# Patient Record
Sex: MALE | Race: WHITE
[De-identification: names, ages, dates, MRNs, and addresses within clinical notes are randomized per-mention and may not be internally consistent; named-entity substitution may affect disease eponyms.]

---

## 2017-01-12 ENCOUNTER — RECORD ABSTRACTING (OUTPATIENT)
Age: 82
End: 2017-01-12

## 2017-01-12 DIAGNOSIS — Z86.2 PERSONAL HISTORY OF DISEASES OF THE BLOOD AND BLOOD-FORMING ORGANS AND CERTAIN DISORDERS INVOLVING THE IMMUNE MECHANISM: ICD-10-CM

## 2017-01-12 DIAGNOSIS — Z78.9 OTHER SPECIFIED HEALTH STATUS: ICD-10-CM

## 2017-01-12 DIAGNOSIS — Z86.79 PERSONAL HISTORY OF OTHER DISEASES OF THE CIRCULATORY SYSTEM: ICD-10-CM

## 2017-01-12 DIAGNOSIS — Z87.448 PERSONAL HISTORY OF OTHER DISEASES OF URINARY SYSTEM: ICD-10-CM

## 2017-01-12 DIAGNOSIS — Z86.73 PERSONAL HISTORY OF TRANSIENT ISCHEMIC ATTACK (TIA), AND CEREBRAL INFARCTION W/OUT RESIDUAL DEFICITS: ICD-10-CM

## 2017-01-12 PROBLEM — Z00.00 ENCOUNTER FOR PREVENTIVE HEALTH EXAMINATION: Status: ACTIVE | Noted: 2017-01-12

## 2017-01-12 RX ORDER — ATORVASTATIN CALCIUM 10 MG/1
10 TABLET, FILM COATED ORAL
Refills: 0 | Status: ACTIVE | COMMUNITY

## 2017-01-12 RX ORDER — FOLIC ACID 1 MG/1
1 TABLET ORAL
Refills: 0 | Status: ACTIVE | COMMUNITY

## 2017-10-16 ENCOUNTER — OUTPATIENT (OUTPATIENT)
Dept: OUTPATIENT SERVICES | Facility: HOSPITAL | Age: 82
LOS: 1 days | Discharge: HOME | End: 2017-10-16

## 2017-10-16 DIAGNOSIS — I21.4 NON-ST ELEVATION (NSTEMI) MYOCARDIAL INFARCTION: ICD-10-CM

## 2017-10-16 DIAGNOSIS — Z01.818 ENCOUNTER FOR OTHER PREPROCEDURAL EXAMINATION: ICD-10-CM

## 2017-10-16 DIAGNOSIS — I95.1 ORTHOSTATIC HYPOTENSION: ICD-10-CM

## 2017-10-16 DIAGNOSIS — N10 ACUTE PYELONEPHRITIS: ICD-10-CM

## 2017-10-18 DIAGNOSIS — Z02.9 ENCOUNTER FOR ADMINISTRATIVE EXAMINATIONS, UNSPECIFIED: ICD-10-CM

## 2017-10-19 ENCOUNTER — OUTPATIENT (OUTPATIENT)
Dept: OUTPATIENT SERVICES | Facility: HOSPITAL | Age: 82
LOS: 1 days | Discharge: HOME | End: 2017-10-19

## 2017-10-19 DIAGNOSIS — I95.1 ORTHOSTATIC HYPOTENSION: ICD-10-CM

## 2017-10-19 DIAGNOSIS — N10 ACUTE PYELONEPHRITIS: ICD-10-CM

## 2017-10-19 DIAGNOSIS — I21.4 NON-ST ELEVATION (NSTEMI) MYOCARDIAL INFARCTION: ICD-10-CM

## 2017-10-25 DIAGNOSIS — D64.9 ANEMIA, UNSPECIFIED: ICD-10-CM

## 2017-10-25 DIAGNOSIS — E11.9 TYPE 2 DIABETES MELLITUS WITHOUT COMPLICATIONS: ICD-10-CM

## 2017-10-25 DIAGNOSIS — I12.9 HYPERTENSIVE CHRONIC KIDNEY DISEASE WITH STAGE 1 THROUGH STAGE 4 CHRONIC KIDNEY DISEASE, OR UNSPECIFIED CHRONIC KIDNEY DISEASE: ICD-10-CM

## 2017-10-25 DIAGNOSIS — Z85.51 PERSONAL HISTORY OF MALIGNANT NEOPLASM OF BLADDER: ICD-10-CM

## 2017-10-25 DIAGNOSIS — Z46.59 ENCOUNTER FOR FITTING AND ADJUSTMENT OF OTHER GASTROINTESTINAL APPLIANCE AND DEVICE: ICD-10-CM

## 2017-10-25 DIAGNOSIS — Z87.891 PERSONAL HISTORY OF NICOTINE DEPENDENCE: ICD-10-CM

## 2017-10-25 DIAGNOSIS — J44.9 CHRONIC OBSTRUCTIVE PULMONARY DISEASE, UNSPECIFIED: ICD-10-CM

## 2017-10-25 DIAGNOSIS — N18.3 CHRONIC KIDNEY DISEASE, STAGE 3 (MODERATE): ICD-10-CM

## 2017-10-25 DIAGNOSIS — Z85.21 PERSONAL HISTORY OF MALIGNANT NEOPLASM OF LARYNX: ICD-10-CM

## 2017-11-22 ENCOUNTER — OUTPATIENT (OUTPATIENT)
Dept: OUTPATIENT SERVICES | Facility: HOSPITAL | Age: 82
LOS: 1 days | Discharge: HOME | End: 2017-11-22

## 2017-11-22 DIAGNOSIS — C32.1 MALIGNANT NEOPLASM OF SUPRAGLOTTIS: ICD-10-CM

## 2017-11-22 DIAGNOSIS — Z01.818 ENCOUNTER FOR OTHER PREPROCEDURAL EXAMINATION: ICD-10-CM

## 2018-07-23 PROBLEM — Z86.73 HISTORY OF STROKE: Status: RESOLVED | Noted: 2017-01-12 | Resolved: 2018-07-23

## 2019-08-14 ENCOUNTER — RECORD ABSTRACTING (OUTPATIENT)
Age: 84
End: 2019-08-14

## 2019-08-14 DIAGNOSIS — M19.90 UNSPECIFIED OSTEOARTHRITIS, UNSPECIFIED SITE: ICD-10-CM

## 2019-08-14 DIAGNOSIS — Z85.51 PERSONAL HISTORY OF MALIGNANT NEOPLASM OF BLADDER: ICD-10-CM

## 2019-08-14 DIAGNOSIS — E11.9 TYPE 2 DIABETES MELLITUS W/OUT COMPLICATIONS: ICD-10-CM

## 2019-08-14 DIAGNOSIS — Z80.0 FAMILY HISTORY OF MALIGNANT NEOPLASM OF DIGESTIVE ORGANS: ICD-10-CM

## 2019-08-14 DIAGNOSIS — Z86.79 PERSONAL HISTORY OF OTHER DISEASES OF THE CIRCULATORY SYSTEM: ICD-10-CM

## 2019-08-14 DIAGNOSIS — K21.9 GASTRO-ESOPHAGEAL REFLUX DISEASE W/OUT ESOPHAGITIS: ICD-10-CM

## 2019-08-14 DIAGNOSIS — J44.9 CHRONIC OBSTRUCTIVE PULMONARY DISEASE, UNSPECIFIED: ICD-10-CM

## 2019-08-14 DIAGNOSIS — Z80.8 FAMILY HISTORY OF MALIGNANT NEOPLASM OF OTHER ORGANS OR SYSTEMS: ICD-10-CM

## 2019-08-14 DIAGNOSIS — Z87.891 PERSONAL HISTORY OF NICOTINE DEPENDENCE: ICD-10-CM

## 2019-08-14 RX ORDER — FUROSEMIDE 80 MG/1
TABLET ORAL
Refills: 0 | Status: ACTIVE | COMMUNITY

## 2019-08-14 RX ORDER — CHROMIUM 200 MCG
TABLET ORAL
Refills: 0 | Status: ACTIVE | COMMUNITY

## 2019-08-14 RX ORDER — METOPROLOL TARTRATE 75 MG/1
TABLET, FILM COATED ORAL
Refills: 0 | Status: ACTIVE | COMMUNITY

## 2019-08-14 RX ORDER — GLIPIZIDE 10 MG/1
TABLET, FILM COATED, EXTENDED RELEASE ORAL
Refills: 0 | Status: ACTIVE | COMMUNITY

## 2019-08-14 RX ORDER — ELECTROLYTES/DEXTROSE
SOLUTION, ORAL ORAL
Refills: 0 | Status: ACTIVE | COMMUNITY

## 2019-09-20 ENCOUNTER — APPOINTMENT (OUTPATIENT)
Dept: RADIATION ONCOLOGY | Facility: CLINIC | Age: 84
End: 2019-09-20
Payer: MEDICARE

## 2019-09-20 VITALS
TEMPERATURE: 97.3 F | DIASTOLIC BLOOD PRESSURE: 55 MMHG | SYSTOLIC BLOOD PRESSURE: 116 MMHG | HEART RATE: 69 BPM | RESPIRATION RATE: 18 BRPM

## 2019-09-20 VITALS — WEIGHT: 204.13 LBS

## 2019-09-20 PROCEDURE — 99212 OFFICE O/P EST SF 10 MIN: CPT

## 2019-09-20 RX ORDER — CLOPIDOGREL 75 MG/1
75 TABLET, FILM COATED ORAL
Refills: 0 | Status: DISCONTINUED | COMMUNITY
End: 2019-09-20

## 2019-09-20 RX ORDER — MIDODRINE HYDROCHLORIDE 2.5 MG/1
2.5 TABLET ORAL
Refills: 0 | Status: DISCONTINUED | COMMUNITY
End: 2019-09-20

## 2019-09-20 RX ORDER — PANTOPRAZOLE 40 MG/1
TABLET, DELAYED RELEASE ORAL
Refills: 0 | Status: DISCONTINUED | COMMUNITY
End: 2019-09-20

## 2019-09-20 NOTE — PHYSICAL EXAM
[Normal] : well developed, well nourished, in no acute distress [de-identified] : He has gained 2 lbs.   ECOG 2 (walks with a cane, rather unsteadily).  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   [de-identified] : no findings in the oral cavity.

## 2019-09-20 NOTE — HISTORY OF PRESENT ILLNESS
[FreeTextEntry1] : 9/20/19 follow up-   pt has continued hoarseness, but it is modest.  He denies any pain,  difficulty with swallowing, eating or drinking.   pt seen and scoped by Dr. Urena on 6/20/19  .pt sprayed to nose with viscous lidocaine 2 percent.

## 2019-09-20 NOTE — PROCEDURE
[Topical Lidocaine] : topical lidocaine [Normal] : the true vocal cords ~T were normal [de-identified] : No erythema or edema.   [de-identified] : The scope was entered through the right nares.

## 2019-09-20 NOTE — DISEASE MANAGEMENT
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

## 2020-01-09 ENCOUNTER — OUTPATIENT (OUTPATIENT)
Dept: OUTPATIENT SERVICES | Facility: HOSPITAL | Age: 85
LOS: 1 days | Discharge: HOME | End: 2020-01-09
Payer: MEDICARE

## 2020-01-09 VITALS
RESPIRATION RATE: 20 BRPM | HEART RATE: 66 BPM | DIASTOLIC BLOOD PRESSURE: 74 MMHG | TEMPERATURE: 98 F | WEIGHT: 199.3 LBS | HEIGHT: 70 IN | SYSTOLIC BLOOD PRESSURE: 119 MMHG | OXYGEN SATURATION: 100 %

## 2020-01-09 DIAGNOSIS — Z01.818 ENCOUNTER FOR OTHER PREPROCEDURAL EXAMINATION: ICD-10-CM

## 2020-01-09 DIAGNOSIS — I25.5 ISCHEMIC CARDIOMYOPATHY: ICD-10-CM

## 2020-01-09 DIAGNOSIS — Z93.6 OTHER ARTIFICIAL OPENINGS OF URINARY TRACT STATUS: Chronic | ICD-10-CM

## 2020-01-09 DIAGNOSIS — Z95.1 PRESENCE OF AORTOCORONARY BYPASS GRAFT: Chronic | ICD-10-CM

## 2020-01-09 DIAGNOSIS — I72.3 ANEURYSM OF ILIAC ARTERY: Chronic | ICD-10-CM

## 2020-01-09 LAB
ALBUMIN SERPL ELPH-MCNC: 3.9 G/DL — SIGNIFICANT CHANGE UP (ref 3.5–5.2)
ALP SERPL-CCNC: 75 U/L — SIGNIFICANT CHANGE UP (ref 30–115)
ALT FLD-CCNC: 17 U/L — SIGNIFICANT CHANGE UP (ref 0–41)
ANION GAP SERPL CALC-SCNC: 14 MMOL/L — SIGNIFICANT CHANGE UP (ref 7–14)
APTT BLD: 34 SEC — SIGNIFICANT CHANGE UP (ref 27–39.2)
AST SERPL-CCNC: 21 U/L — SIGNIFICANT CHANGE UP (ref 0–41)
BASOPHILS # BLD AUTO: 0.04 K/UL — SIGNIFICANT CHANGE UP (ref 0–0.2)
BASOPHILS NFR BLD AUTO: 0.5 % — SIGNIFICANT CHANGE UP (ref 0–1)
BILIRUB SERPL-MCNC: 0.6 MG/DL — SIGNIFICANT CHANGE UP (ref 0.2–1.2)
BUN SERPL-MCNC: 49 MG/DL — HIGH (ref 10–20)
CALCIUM SERPL-MCNC: 9.4 MG/DL — SIGNIFICANT CHANGE UP (ref 8.5–10.1)
CHLORIDE SERPL-SCNC: 102 MMOL/L — SIGNIFICANT CHANGE UP (ref 98–110)
CO2 SERPL-SCNC: 24 MMOL/L — SIGNIFICANT CHANGE UP (ref 17–32)
CREAT SERPL-MCNC: 1.9 MG/DL — HIGH (ref 0.7–1.5)
EOSINOPHIL # BLD AUTO: 0.23 K/UL — SIGNIFICANT CHANGE UP (ref 0–0.7)
EOSINOPHIL NFR BLD AUTO: 3.1 % — SIGNIFICANT CHANGE UP (ref 0–8)
ESTIMATED AVERAGE GLUCOSE: 137 MG/DL — HIGH (ref 68–114)
GLUCOSE SERPL-MCNC: 98 MG/DL — SIGNIFICANT CHANGE UP (ref 70–99)
HBA1C BLD-MCNC: 6.4 % — HIGH (ref 4–5.6)
HCT VFR BLD CALC: 39.4 % — LOW (ref 42–52)
HGB BLD-MCNC: 11.5 G/DL — LOW (ref 14–18)
IMM GRANULOCYTES NFR BLD AUTO: 0.9 % — HIGH (ref 0.1–0.3)
INR BLD: 1.2 RATIO — SIGNIFICANT CHANGE UP (ref 0.65–1.3)
LYMPHOCYTES # BLD AUTO: 1.01 K/UL — LOW (ref 1.2–3.4)
LYMPHOCYTES # BLD AUTO: 13.6 % — LOW (ref 20.5–51.1)
MCHC RBC-ENTMCNC: 20.8 PG — LOW (ref 27–31)
MCHC RBC-ENTMCNC: 29.2 G/DL — LOW (ref 32–37)
MCV RBC AUTO: 71.1 FL — LOW (ref 80–94)
MONOCYTES # BLD AUTO: 0.73 K/UL — HIGH (ref 0.1–0.6)
MONOCYTES NFR BLD AUTO: 9.9 % — HIGH (ref 1.7–9.3)
NEUTROPHILS # BLD AUTO: 5.32 K/UL — SIGNIFICANT CHANGE UP (ref 1.4–6.5)
NEUTROPHILS NFR BLD AUTO: 72 % — SIGNIFICANT CHANGE UP (ref 42.2–75.2)
NRBC # BLD: 0 /100 WBCS — SIGNIFICANT CHANGE UP (ref 0–0)
PLATELET # BLD AUTO: 221 K/UL — SIGNIFICANT CHANGE UP (ref 130–400)
POTASSIUM SERPL-MCNC: 4.6 MMOL/L — SIGNIFICANT CHANGE UP (ref 3.5–5)
POTASSIUM SERPL-SCNC: 4.6 MMOL/L — SIGNIFICANT CHANGE UP (ref 3.5–5)
PROT SERPL-MCNC: 8.2 G/DL — HIGH (ref 6–8)
PROTHROM AB SERPL-ACNC: 13.8 SEC — HIGH (ref 9.95–12.87)
RBC # BLD: 5.54 M/UL — SIGNIFICANT CHANGE UP (ref 4.7–6.1)
RBC # FLD: 17.2 % — HIGH (ref 11.5–14.5)
SODIUM SERPL-SCNC: 140 MMOL/L — SIGNIFICANT CHANGE UP (ref 135–146)
WBC # BLD: 7.4 K/UL — SIGNIFICANT CHANGE UP (ref 4.8–10.8)
WBC # FLD AUTO: 7.4 K/UL — SIGNIFICANT CHANGE UP (ref 4.8–10.8)

## 2020-01-09 PROCEDURE — 93010 ELECTROCARDIOGRAM REPORT: CPT

## 2020-01-09 NOTE — H&P PST ADULT - NSICDXPASTMEDICALHX_GEN_ALL_CORE_FT
PAST MEDICAL HISTORY:  Bladder cancer     CAD (coronary artery disease)     CVA (cerebral vascular accident) 2015

## 2020-01-09 NOTE — H&P PST ADULT - NSICDXPASTSURGICALHX_GEN_ALL_CORE_FT
PAST SURGICAL HISTORY:  Iliac artery aneurysm 2002    Presence of urostomy 2003    S/P CABG x 4 1991

## 2020-01-09 NOTE — H&P PST ADULT - HISTORY OF PRESENT ILLNESS
85 y/o male scheduled for icd implant  reports h/o poor ef  reports no c/o cp,sob,palpitations,cough or dysuria  1-2 fos without sob

## 2020-01-13 ENCOUNTER — OUTPATIENT (OUTPATIENT)
Dept: OUTPATIENT SERVICES | Facility: HOSPITAL | Age: 85
LOS: 1 days | Discharge: HOME | End: 2020-01-13

## 2020-01-13 DIAGNOSIS — Z95.1 PRESENCE OF AORTOCORONARY BYPASS GRAFT: Chronic | ICD-10-CM

## 2020-01-13 DIAGNOSIS — Z93.6 OTHER ARTIFICIAL OPENINGS OF URINARY TRACT STATUS: Chronic | ICD-10-CM

## 2020-01-13 DIAGNOSIS — I72.3 ANEURYSM OF ILIAC ARTERY: Chronic | ICD-10-CM

## 2020-01-13 PROBLEM — I63.9 CEREBRAL INFARCTION, UNSPECIFIED: Chronic | Status: ACTIVE | Noted: 2020-01-09

## 2020-01-13 PROBLEM — I25.10 ATHEROSCLEROTIC HEART DISEASE OF NATIVE CORONARY ARTERY WITHOUT ANGINA PECTORIS: Chronic | Status: ACTIVE | Noted: 2020-01-09

## 2020-01-13 PROBLEM — C67.9 MALIGNANT NEOPLASM OF BLADDER, UNSPECIFIED: Chronic | Status: ACTIVE | Noted: 2020-01-09

## 2020-01-13 LAB — MRSA PCR RESULT.: NEGATIVE — SIGNIFICANT CHANGE UP

## 2020-01-22 ENCOUNTER — INPATIENT (INPATIENT)
Facility: HOSPITAL | Age: 85
LOS: 0 days | Discharge: HOME | End: 2020-01-23
Attending: INTERNAL MEDICINE | Admitting: INTERNAL MEDICINE
Payer: MEDICARE

## 2020-01-22 ENCOUNTER — OUTPATIENT (OUTPATIENT)
Dept: OUTPATIENT SERVICES | Facility: HOSPITAL | Age: 85
LOS: 1 days | Discharge: HOME | End: 2020-01-22

## 2020-01-22 VITALS
HEART RATE: 55 BPM | SYSTOLIC BLOOD PRESSURE: 171 MMHG | RESPIRATION RATE: 18 BRPM | DIASTOLIC BLOOD PRESSURE: 71 MMHG | TEMPERATURE: 96 F

## 2020-01-22 DIAGNOSIS — I25.10 ATHEROSCLEROTIC HEART DISEASE OF NATIVE CORONARY ARTERY WITHOUT ANGINA PECTORIS: ICD-10-CM

## 2020-01-22 DIAGNOSIS — I47.2 VENTRICULAR TACHYCARDIA: ICD-10-CM

## 2020-01-22 DIAGNOSIS — Z85.51 PERSONAL HISTORY OF MALIGNANT NEOPLASM OF BLADDER: ICD-10-CM

## 2020-01-22 DIAGNOSIS — I48.92 UNSPECIFIED ATRIAL FLUTTER: ICD-10-CM

## 2020-01-22 DIAGNOSIS — Z82.49 FAMILY HISTORY OF ISCHEMIC HEART DISEASE AND OTHER DISEASES OF THE CIRCULATORY SYSTEM: ICD-10-CM

## 2020-01-22 DIAGNOSIS — Z86.73 PERSONAL HISTORY OF TRANSIENT ISCHEMIC ATTACK (TIA), AND CEREBRAL INFARCTION WITHOUT RESIDUAL DEFICITS: ICD-10-CM

## 2020-01-22 DIAGNOSIS — Z87.891 PERSONAL HISTORY OF NICOTINE DEPENDENCE: ICD-10-CM

## 2020-01-22 DIAGNOSIS — I44.0 ATRIOVENTRICULAR BLOCK, FIRST DEGREE: ICD-10-CM

## 2020-01-22 DIAGNOSIS — Z93.6 OTHER ARTIFICIAL OPENINGS OF URINARY TRACT STATUS: Chronic | ICD-10-CM

## 2020-01-22 DIAGNOSIS — Z02.9 ENCOUNTER FOR ADMINISTRATIVE EXAMINATIONS, UNSPECIFIED: ICD-10-CM

## 2020-01-22 DIAGNOSIS — Z95.1 PRESENCE OF AORTOCORONARY BYPASS GRAFT: Chronic | ICD-10-CM

## 2020-01-22 DIAGNOSIS — I25.5 ISCHEMIC CARDIOMYOPATHY: ICD-10-CM

## 2020-01-22 DIAGNOSIS — I72.3 ANEURYSM OF ILIAC ARTERY: Chronic | ICD-10-CM

## 2020-01-22 DIAGNOSIS — Z95.1 PRESENCE OF AORTOCORONARY BYPASS GRAFT: ICD-10-CM

## 2020-01-22 DIAGNOSIS — I49.3 VENTRICULAR PREMATURE DEPOLARIZATION: ICD-10-CM

## 2020-01-22 DIAGNOSIS — Z95.828 PRESENCE OF OTHER VASCULAR IMPLANTS AND GRAFTS: ICD-10-CM

## 2020-01-22 DIAGNOSIS — I50.9 HEART FAILURE, UNSPECIFIED: ICD-10-CM

## 2020-01-22 DIAGNOSIS — Z93.6 OTHER ARTIFICIAL OPENINGS OF URINARY TRACT STATUS: ICD-10-CM

## 2020-01-22 LAB
GLUCOSE BLDC GLUCOMTR-MCNC: 108 MG/DL — HIGH (ref 70–99)
GLUCOSE BLDC GLUCOMTR-MCNC: 163 MG/DL — HIGH (ref 70–99)
GLUCOSE BLDC GLUCOMTR-MCNC: 253 MG/DL — HIGH (ref 70–99)

## 2020-01-22 PROCEDURE — 71045 X-RAY EXAM CHEST 1 VIEW: CPT | Mod: 26

## 2020-01-22 PROCEDURE — 33249 INSJ/RPLCMT DEFIB W/LEAD(S): CPT

## 2020-01-22 RX ORDER — DEXTROSE 50 % IN WATER 50 %
15 SYRINGE (ML) INTRAVENOUS ONCE
Refills: 0 | Status: DISCONTINUED | OUTPATIENT
Start: 2020-01-22 | End: 2020-01-23

## 2020-01-22 RX ORDER — CEFAZOLIN SODIUM 1 G
1000 VIAL (EA) INJECTION EVERY 8 HOURS
Refills: 0 | Status: COMPLETED | OUTPATIENT
Start: 2020-01-22 | End: 2020-01-23

## 2020-01-22 RX ORDER — GLUCAGON INJECTION, SOLUTION 0.5 MG/.1ML
1 INJECTION, SOLUTION SUBCUTANEOUS ONCE
Refills: 0 | Status: DISCONTINUED | OUTPATIENT
Start: 2020-01-22 | End: 2020-01-23

## 2020-01-22 RX ORDER — FUROSEMIDE 40 MG
20 TABLET ORAL DAILY
Refills: 0 | Status: DISCONTINUED | OUTPATIENT
Start: 2020-01-22 | End: 2020-01-23

## 2020-01-22 RX ORDER — CEFAZOLIN SODIUM 1 G
2000 VIAL (EA) INJECTION ONCE
Refills: 0 | Status: COMPLETED | OUTPATIENT
Start: 2020-01-22 | End: 2020-01-22

## 2020-01-22 RX ORDER — METOPROLOL TARTRATE 50 MG
12.5 TABLET ORAL EVERY 12 HOURS
Refills: 0 | Status: DISCONTINUED | OUTPATIENT
Start: 2020-01-22 | End: 2020-01-23

## 2020-01-22 RX ORDER — SODIUM CHLORIDE 9 MG/ML
1000 INJECTION, SOLUTION INTRAVENOUS
Refills: 0 | Status: DISCONTINUED | OUTPATIENT
Start: 2020-01-22 | End: 2020-01-23

## 2020-01-22 RX ORDER — DEXTROSE 50 % IN WATER 50 %
12.5 SYRINGE (ML) INTRAVENOUS ONCE
Refills: 0 | Status: DISCONTINUED | OUTPATIENT
Start: 2020-01-22 | End: 2020-01-23

## 2020-01-22 RX ORDER — DEXTROSE 50 % IN WATER 50 %
25 SYRINGE (ML) INTRAVENOUS ONCE
Refills: 0 | Status: DISCONTINUED | OUTPATIENT
Start: 2020-01-22 | End: 2020-01-23

## 2020-01-22 RX ORDER — ATORVASTATIN CALCIUM 80 MG/1
10 TABLET, FILM COATED ORAL AT BEDTIME
Refills: 0 | Status: DISCONTINUED | OUTPATIENT
Start: 2020-01-22 | End: 2020-01-23

## 2020-01-22 RX ADMIN — Medication 100 MILLIGRAM(S): at 22:14

## 2020-01-22 RX ADMIN — Medication 100 MILLIGRAM(S): at 17:41

## 2020-01-22 RX ADMIN — Medication 12.5 MILLIGRAM(S): at 17:39

## 2020-01-22 RX ADMIN — ATORVASTATIN CALCIUM 10 MILLIGRAM(S): 80 TABLET, FILM COATED ORAL at 22:11

## 2020-01-22 RX ADMIN — Medication 100 MILLIGRAM(S): at 12:00

## 2020-01-22 RX ADMIN — Medication 100 MILLIGRAM(S): at 12:43

## 2020-01-22 NOTE — PROGRESS NOTE ADULT - SUBJECTIVE AND OBJECTIVE BOX
Electrophysiology Brief Post-Op Note    I have personally seen and examined the patient.  I agree with the history and physical which I have reviewed and noted any changes below.  01-22-20 @ 07:04    PRE-OP DIAGNOSIS: CHF    POST-OP DIAGNOSIS: CHF    PROCEDURE: ICD implant    Physician: Jalen  Assistant: None    ESTIMATED BLOOD LOSS:   10    mL    ANESTHESIA TYPE:  [  ]General Anesthesia  [ X ] Sedation  [  X] Local/Regional    CONDITION  [  ] Critical  [  ] Serious  [  ]Fair  [X  ]Good      SPECIMENS REMOVED (IF APPLICABLE):  none    IMPLANTS (IF APPLICABLE)  ICD    FINDINGS  PLAN OF CARE  - Ancef 1g IVq8 h  - Chest X-ray PA/Lat now and tomorrow am  - Device interrogation tomorrow in am  - DC all heparin produces and lovenox  - No anticoagulation unless recomended by EP attending

## 2020-01-22 NOTE — CHART NOTE - NSCHARTNOTEFT_GEN_A_CORE
Cardiac Electrophysiology Procedure Note    Procedure:  Single Chamber Implantable Cardioverter Defibrillator (ICD) Implant  Defibrillation threshold testing  Fluoroscopy    Indication: Patient with history of ICM,CABG, CAD referred for primary prevention ICD       A thorough discussion was had with the patient and his family concerning all aspects of ICD therapy. We reviewed the data supporting ICD therapy and how it applies individually.  We discussed the procedures, risks and outcomes of ICD implantation an living with an ICD. We discussed management of ICD therapy throughout life, including deactivation of the ICD. After all questions were answered, it was a shared decision to proceed with ICD therapy.      OPERATORS:  Attending:	     Nuno Rao MD	    EQUIPMENT IMPLANTED AND BASELINE PARAMETERS  Pulse Generator:   :	Planview  Model Number	[id number]  Serial Number	[id number]  			  Ventricular Lead:  Model Number:	6935  Serial Number:	[id number]  Location:	RV apical septum  Threshold:	0.6 V at 0.5 msec  Sensin.0  mV  Impedance:	    579 ohms	      DESCRIPTION OF PROCEDURE          The patient was brought to the Procedure Room in a nonsedated and fasting state, having received preoperative antibiotics. Informed, written consent was obtained prior to the procedure.  The left shoulder region was cleaned and prepped with serial applications of chlorhexidine solution. Patient was then covered from head to toe with sterile drapes in the usual manner. Intermittent boluses of Versed and Fentanyl were used to maintain comfort and analgesia throughout the procedure. Blood pressure, oxygenation and level of comfort were stable throughout. The fluoroscopic anatomy of the left shoulder region was inspected.           	The left deltopectoral groove region was defined.  Lidocaine solution were infiltrated into the skin overlying the left deltopectoral groove. A 4.0 cm. incision was then made overlying and parallel to the deltopectoral groove. This incision was extended down to the pre-pectoral fascia of the pectoralis major muscle using blunt and sharp dissection and electrocautery.   	Next, using the modified Seldinger technique, the left axillary vein was accessed once with a micropuncture needle using fluoroscopic guidance.  Fluoroscopy was used to confirm the capability to position the guide wire in the inferior vena cava, thus confirming venous access.  	  	A 9 Fr introducer sheath was inserted over the guide wire, and the dilator was removed and the guide wire was kept in place.  Next, the RV lead was advanced into the heart.  Next, using a combination of straight and curved stylets, the right ventricular lead was advanced to the RV.  A site was found in the RV apical septum with adequate pacing threshold, sensing, and impedance without diaphragmatic stimulation, the lead's screw was extended. The lead's position and adequate slack were confirmed in [place] and VALLECILLO projections.  The introducer sheath was split and removed. The lead's position and adequate slack were confirmed in [place] and VALLE[place]LLO projections.  The lead's sewing sleeve was positioned at the site of entry into muscle layer, and the lead secured to the pre-pectoral fascia with two 2-0 silk sutures tied around the sewing sleeve encapsulating the lead.    	Next, the pocket was inspected for bleeding, and electrocautery applied where appropriate. The wound was irrigated with copious amounts of vancomycin solution.	The lead was wiped clean and then connected to the pulse generator. The lead and pulse generator was coiled into the pocket.   		The wound margins approximated well, without any tension or overlap. The wound was closed using an interrupted layer of 2-0 absorbable Dacron suture for the deep fascial layer. This was followed by a continuous layer of 3-0 absorbable suture. The skin was then closed using 4-0 absorbable Dacron sutures. Steri-strips were placed over the wound.  A dry, sterile dressing was placed over this. The patient's left upper extremity was placed in a sling. An ice pack was placed on top of the wound, and the patient was returned to a hospital room in stable condition. Needle count was performed and found to be consistent at the end of the procedure    COMPLICATIONS:  The patient tolerated the procedure well. There were no immediate complications.      CONCLUSIONS:  Successful single chamber Cardioverter Defibrillator (ICD) Implantation            _______________________________  Nuno Rao MD  Cardiac Electrophysiology

## 2020-01-22 NOTE — PRE-ANESTHESIA EVALUATION ADULT - NSANTHPMHFT_GEN_ALL_CORE
PAST chart reviewed, pt and wife interviewed and examined.  S/P CVA' R/S weakness, walks with support.

## 2020-01-23 ENCOUNTER — TRANSCRIPTION ENCOUNTER (OUTPATIENT)
Age: 85
End: 2020-01-23

## 2020-01-23 VITALS — HEART RATE: 72 BPM | DIASTOLIC BLOOD PRESSURE: 65 MMHG | SYSTOLIC BLOOD PRESSURE: 145 MMHG | RESPIRATION RATE: 18 BRPM

## 2020-01-23 LAB
GLUCOSE BLDC GLUCOMTR-MCNC: 126 MG/DL — HIGH (ref 70–99)
GLUCOSE BLDC GLUCOMTR-MCNC: 161 MG/DL — HIGH (ref 70–99)

## 2020-01-23 PROCEDURE — 71046 X-RAY EXAM CHEST 2 VIEWS: CPT | Mod: 26

## 2020-01-23 PROCEDURE — 93283 PRGRMG EVAL IMPLANTABLE DFB: CPT | Mod: 26

## 2020-01-23 PROCEDURE — 93010 ELECTROCARDIOGRAM REPORT: CPT

## 2020-01-23 RX ORDER — APIXABAN 2.5 MG/1
1 TABLET, FILM COATED ORAL
Qty: 60 | Refills: 2
Start: 2020-01-23 | End: 2020-04-21

## 2020-01-23 RX ORDER — APIXABAN 2.5 MG/1
1 TABLET, FILM COATED ORAL
Qty: 60 | Refills: 1
Start: 2020-01-23 | End: 2020-03-22

## 2020-01-23 RX ORDER — CEPHALEXIN 500 MG
1 CAPSULE ORAL
Qty: 10 | Refills: 0
Start: 2020-01-23 | End: 2020-01-27

## 2020-01-23 RX ADMIN — Medication 12.5 MILLIGRAM(S): at 05:39

## 2020-01-23 RX ADMIN — Medication 20 MILLIGRAM(S): at 05:40

## 2020-01-23 RX ADMIN — Medication 100 MILLIGRAM(S): at 06:27

## 2020-01-23 NOTE — DISCHARGE NOTE PROVIDER - CARE PROVIDER_API CALL
Nuno Rao; ANGELO)  Cardiac Electrophysiology  [address] [place] [address]  Phone: [phone]  Fax: [phone]  Established Patient  Follow Up Time: 2 weeks

## 2020-01-23 NOTE — PROGRESS NOTE ADULT - ASSESSMENT
Impression:    Plan: This is a 84 y/o male with PMH CVA 2015, bladder Ca, CAD s/p CABG x4, CHF who is POD #1 from a SC ICD, no immediate complications noted. On tele review patietn noted to have Aflutter, new onset.     Impression:  S/p SC ICD (Medtronic) on 1/22/2020  New Onset Aflutter  Hx CHF    Plan:  - CXR PENDING  - Will start  Eliquis tomorrow night, 48 hours post procedure  - Interrogation complete, functioning well  - EKG complete, SR  - Keflex 500 mg PO Q 12 x 5 days    Discharge Instructions:  - Start Eliquis Friday NIGHT  - No heavy lifting >5 lbs. for 4-6 weeks  - Do not raise your arm above shoulder level for 4-6 weeks.   - Do not shower for 5 days, may resume on Monday  - No submerging in water for 1 month.  - Leave steri-strips in place, they will fall off on their own.  - No driving for 2 weeks.  - Antibiotics for 5 days  - F/u with Dr. Rao in 2 weeks at Dr. Neil office This is a 84 y/o male with PMH CVA 2015, bladder Ca, CAD s/p CABG x4, CHF who is POD #1 from a SC ICD, no immediate complications noted. On tele review patietn noted to have Aflutter, new onset.     Impression:  S/p SC ICD (Medtronic) on 1/22/2020  New Onset Aflutter with CHADS VASc at least 6 (CHF, CVA, Age>75, CAD)  Hx CHF    Plan:  - Will start  Eliquis Friday night, 48 hours post procedure  - Interrogation complete, functioning well  - EKG complete, SR  - Keflex 500 mg PO Q 12 x 5 days    Discharge Instructions:  - Start Eliquis Friday NIGHT  - No heavy lifting >5 lbs. for 4-6 weeks  - Do not raise your arm above shoulder level for 4-6 weeks.   - Do not shower for 5 days, may resume on Monday  - No submerging in water for 1 month.  - Leave steri-strips in place, they will fall off on their own.  - No driving for 2 weeks.  - Antibiotics for 5 days  - F/u with Dr. Rao in 2 weeks at Dr. Neil office

## 2020-01-23 NOTE — PROGRESS NOTE ADULT - SUBJECTIVE AND OBJECTIVE BOX
INTERVAL HPI/OVERNIGHT EVENTS:  Patient in SR with frequent PVCs, multiple runs NSVT overnight. Patient denies fever, chills, dizziness, syncope, chest pain, palpitations, SOB, cough, abd pain, n/v/d/c, dysuria, hematuria or unusual rash. Tegaderm removed from surgical incision.    MEDICATIONS  (STANDING):  atorvastatin 10 milliGRAM(s) Oral at bedtime  dextrose 5%. 1000 milliLiter(s) (50 mL/Hr) IV Continuous <Continuous>  dextrose 50% Injectable 12.5 Gram(s) IV Push once  dextrose 50% Injectable 25 Gram(s) IV Push once  dextrose 50% Injectable 25 Gram(s) IV Push once  furosemide    Tablet 20 milliGRAM(s) Oral daily  metoprolol succinate ER 12.5 milliGRAM(s) Oral every 12 hours    MEDICATIONS  (PRN):  dextrose 40% Gel 15 Gram(s) Oral once PRN Blood Glucose LESS THAN 70 milliGRAM(s)/deciliter  glucagon  Injectable 1 milliGRAM(s) IntraMuscular once PRN Glucose LESS THAN 70 milligrams/deciliter      Allergies    No Known Allergies    Intolerances        REVIEW OF SYSTEMS  A ten-point review of systems was otherwise negative except as noted.        Vital Signs Last 24 Hrs  T(C): 35.1 (23 Jan 2020 05:08), Max: 36.3 (22 Jan 2020 20:33)  T(F): 95.1 (23 Jan 2020 05:08), Max: 97.3 (22 Jan 2020 20:33)  HR: 78 (23 Jan 2020 05:08) (55 - 95)  BP: 166/88 (23 Jan 2020 05:08) (138/86 - 171/71)  BP(mean): --  RR: 18 (23 Jan 2020 05:08) (18 - 18)  SpO2: --    01-22-20 @ 07:01  -  01-23-20 @ 07:00  --------------------------------------------------------  IN: 410 mL / OUT: 1250 mL / NET: -840 mL        Physical Exam  GENERAL: In no apparent distress, well nourished, and hydrated.  HEART: irregular rate and rhythm; No murmurs, rubs, or gallops.  PULMONARY: Clear to auscultation and perfusion.  No rales, wheezing, or rhonchi bilaterally.  CHEST: Surgial dressing c/d/i, no hematoma or drainage noted.  ABDOMEN: Soft, Nontender, Nondistended; Bowel sounds present  EXTREMITIES:  2+ Peripheral Pulses, No clubbing, cyanosis. trace edema to left ankle (chronic)  NEUROLOGICAL: AO x3, BLACKBURN, speech clear    LABS:        RADIOLOGY & ADDITIONAL TESTS: INTERVAL HPI/OVERNIGHT EVENTS:  Patient in SR with frequent PVCs, multiple runs NSVT overnight. Noted Aflutter, new. Patient denies fever, chills, dizziness, syncope, chest pain, palpitations, SOB, cough, abd pain, n/v/d/c, dysuria, hematuria or unusual rash. Tegaderm removed from surgical incision.    MEDICATIONS  (STANDING):  atorvastatin 10 milliGRAM(s) Oral at bedtime  furosemide    Tablet 20 milliGRAM(s) Oral daily  metoprolol succinate ER 12.5 milliGRAM(s) Oral every 12 hours    MEDICATIONS  (PRN):  dextrose 40% Gel 15 Gram(s) Oral once PRN Blood Glucose LESS THAN 70 milliGRAM(s)/deciliter  glucagon  Injectable 1 milliGRAM(s) IntraMuscular once PRN Glucose LESS THAN 70 milligrams/deciliter      Allergies    No Known Allergies    Intolerances        REVIEW OF SYSTEMS  A ten-point review of systems was otherwise negative except as noted.        Vital Signs Last 24 Hrs  T(C): 35.1 (23 Jan 2020 05:08), Max: 36.3 (22 Jan 2020 20:33)  T(F): 95.1 (23 Jan 2020 05:08), Max: 97.3 (22 Jan 2020 20:33)  HR: 78 (23 Jan 2020 05:08) (55 - 95)  BP: 166/88 (23 Jan 2020 05:08) (138/86 - 171/71)  BP(mean): --  RR: 18 (23 Jan 2020 05:08) (18 - 18)  SpO2: --    01-22-20 @ 07:01  -  01-23-20 @ 07:00  --------------------------------------------------------  IN: 410 mL / OUT: 1250 mL / NET: -840 mL        Physical Exam  GENERAL: In no apparent distress, well nourished, and hydrated.  HEART: irregular rate and rhythm; No murmurs, rubs, or gallops.  PULMONARY: Clear to auscultation and perfusion.  No rales, wheezing, or rhonchi bilaterally.  CHEST: Surgial dressing c/d/i, no hematoma or drainage noted.  ABDOMEN: Soft, Nontender, Nondistended; Bowel sounds present  EXTREMITIES:  2+ Peripheral Pulses, No clubbing, cyanosis. trace edema to left ankle (chronic)  NEUROLOGICAL: AO x3, BLACKBURN, speech clear    LABS:        RADIOLOGY & ADDITIONAL TESTS: INTERVAL HPI/OVERNIGHT EVENTS:  Patient in SR with frequent PVCs, multiple runs NSVT overnight (no longer than 3-4 beats). Noted to have new Aflutter on tele. Patient denies fever, chills, dizziness, syncope, chest pain, palpitations, SOB, cough, abd pain, n/v/d/c, dysuria, hematuria or unusual rash. Tegaderm removed from surgical incision. Steri-strips in place.     MEDICATIONS  (STANDING):  atorvastatin 10 milliGRAM(s) Oral at bedtime  furosemide    Tablet 20 milliGRAM(s) Oral daily  metoprolol succinate ER 12.5 milliGRAM(s) Oral every 12 hours    MEDICATIONS  (PRN):  dextrose 40% Gel 15 Gram(s) Oral once PRN Blood Glucose LESS THAN 70 milliGRAM(s)/deciliter  glucagon  Injectable 1 milliGRAM(s) IntraMuscular once PRN Glucose LESS THAN 70 milligrams/deciliter      Allergies  No Known Allergies  Intolerances    REVIEW OF SYSTEMS  A ten-point review of systems was otherwise negative except as noted.    Vital Signs Last 24 Hrs  T(C): 35.1 (23 Jan 2020 05:08), Max: 36.3 (22 Jan 2020 20:33)  T(F): 95.1 (23 Jan 2020 05:08), Max: 97.3 (22 Jan 2020 20:33)  HR: 78 (23 Jan 2020 05:08) (55 - 95)  BP: 166/88 (23 Jan 2020 05:08) (138/86 - 171/71)  BP(mean): --  RR: 18 (23 Jan 2020 05:08) (18 - 18)  SpO2: --    01-22-20 @ 07:01  -  01-23-20 @ 07:00  --------------------------------------------------------  IN: 410 mL / OUT: 1250 mL / NET: -840 mL      Physical Exam  GENERAL: In no apparent distress, well nourished, and hydrated.  HEART: irregular rate and rhythm; No murmurs, rubs, or gallops.  PULMONARY: Clear to auscultation and perfusion.  No rales, wheezing, or rhonchi bilaterally.  CHEST: Steri-strips c/d/i, no hematoma or drainage noted.  ABDOMEN: Soft, Nontender, Nondistended; Bowel sounds present  EXTREMITIES:  2+ Peripheral Pulses, No clubbing, cyanosis. trace edema to left ankle (chronic)  NEUROLOGICAL: AO x3, BLACKBURN, speech clear    RADIOLOGY & ADDITIONAL TESTS:  < from: Xray Chest 2 Views PA/Lat (01.23.20 @ 10:08) >  Findings:  Support devices: Telemetry leads are seen. Single lead left-sided ICD.  Cardiac/mediastinum/hilum: Patient is status post median sternotomy. The heart is enlarged.  Lung parenchyma/Pleura: Right pleural thickening.  Skeleton/soft tissues: Unchanged.    Impression:    Right pleural thickening with cardiomegaly. Support devices as described.  < end of copied text >

## 2020-01-23 NOTE — DISCHARGE NOTE NURSING/CASE MANAGEMENT/SOCIAL WORK - NSDCPEPT PROEDHF_GEN_ALL_CORE
Report signs and symptoms to primary care provider/Monitor weight daily/Call primary care provider for follow up after discharge/Low salt diet/Activities as tolerated

## 2020-01-23 NOTE — DISCHARGE NOTE PROVIDER - NSDCMRMEDTOKEN_GEN_ALL_CORE_FT
apixaban 5 mg oral tablet: 1 tab(s) orally 2 times a day MDD:2  atorvastatin 10 mg oral tablet: 1 tab(s) orally once a day  cephalexin 500 mg oral capsule: 1 cap(s) orally 2 times a day MDD:2  furosemide 20 mg oral tablet: 1 tab(s) orally once a day (in the morning)  glimepiride 2 mg oral tablet: 1 tab(s) orally once a day  metoprolol succinate 25 mg oral tablet, extended release: 0.5 tab(s) orally 3 times a day apixaban 2.5 mg oral tablet: 1 tab(s) orally every 12 hours   atorvastatin 10 mg oral tablet: 1 tab(s) orally once a day  cephalexin 500 mg oral capsule: 1 cap(s) orally 2 times a day MDD:2  furosemide 20 mg oral tablet: 1 tab(s) orally once a day (in the morning)  glimepiride 2 mg oral tablet: 1 tab(s) orally once a day  metoprolol succinate 25 mg oral tablet, extended release: 0.5 tab(s) orally 3 times a day

## 2020-01-23 NOTE — DISCHARGE NOTE NURSING/CASE MANAGEMENT/SOCIAL WORK - PATIENT PORTAL LINK FT
You can access the FollowMyHealth Patient Portal offered by [facility] by registering at the following website: [email or web address]. By joining [employer]’s FollowMyHealth portal, you will also be able to view your health information using other applications (apps) compatible with our system.

## 2020-01-23 NOTE — PROGRESS NOTE ADULT - ATTENDING COMMENTS
Covering for Dr. Rao    CXR, interrogation and tele reviewed.  Keflex 500 mg PO BID x 5 days.  Start Eliquis 2.5mg PO BID for new onset AFlutter with CHADS VASc at least 6. Pt >80 and Cr 1.9.   No shower x 3 days.  No lifting left arm above shoulder level x 1 month. No heavy lifting with left arm x 1 mo.  No swimming or hot tubs x 1 month.  Follow up with Dr. Rao in Dr. Mckeon's office in 2 weeks ([address] [place] [address] [address] Floor)

## 2020-01-23 NOTE — DISCHARGE NOTE NURSING/CASE MANAGEMENT/SOCIAL WORK - NSDCPEEMAIL_GEN_ALL_CORE
[facility] for Tobacco Control email [email or web address]

## 2020-01-23 NOTE — DISCHARGE NOTE NURSING/CASE MANAGEMENT/SOCIAL WORK - NSTRANSFERDENTURES_GEN_A_NUR
Message   Received: Today   Message Contents   ROLDAN Oliveira R.N. BMP if not done at dialysis today.    Previous Messages      ----- Message -----   From: Orin Hansen R.N.   Sent: 8/22/2019   4:41 PM PDT   To: James Mendoza M.D.         ----- Message -----   From: ILAN Montes   Sent: 8/22/2019   4:36 PM PDT   To: EDU Moran,     I saw this FK patient today.  He thought Dr. mendoza had ordered labs for him to do Monday but I don't see anything ordered.  Can you check with FK?     Thanks,   clint KAUFFMAN prder placed.    Pt called. No answer. LVM with above information. Pt informed if already drawn at dialysis today, then it is not neccessary to be drawn again. Contact information provided for questions or concerns.   
full

## 2020-01-23 NOTE — DISCHARGE NOTE NURSING/CASE MANAGEMENT/SOCIAL WORK - NSDCPEWEB_GEN_ALL_CORE
[facility] for Tobacco Control website --- [email or web address] website --- [email or web address]

## 2020-01-23 NOTE — DISCHARGE NOTE PROVIDER - NSDCFUADDINST_GEN_ALL_CORE_FT
- Start Eliquis Friday NIGHT 1/24  - No heavy lifting >5 lbs. for 4-6 weeks  - Do not raise your arm above shoulder level for 4-6 weeks.   - Do not shower for 5 days, may resume on Monday  - No submerging in water for 1 month.  - Leave steri-strips in place, they will fall off on their own.  - No driving for 2 weeks.  - Antibiotics for 5 days

## 2020-04-23 ENCOUNTER — APPOINTMENT (OUTPATIENT)
Dept: CARDIOLOGY | Facility: CLINIC | Age: 85
End: 2020-04-23
Payer: MEDICARE

## 2020-04-23 PROCEDURE — 93296 REM INTERROG EVL PM/IDS: CPT

## 2020-04-23 PROCEDURE — 93295 DEV INTERROG REMOTE 1/2/MLT: CPT

## 2020-07-22 ENCOUNTER — APPOINTMENT (OUTPATIENT)
Dept: CARDIOLOGY | Facility: CLINIC | Age: 85
End: 2020-07-22

## 2020-07-23 ENCOUNTER — APPOINTMENT (OUTPATIENT)
Dept: CARDIOLOGY | Facility: CLINIC | Age: 85
End: 2020-07-23

## 2020-10-05 ENCOUNTER — APPOINTMENT (OUTPATIENT)
Dept: RADIATION ONCOLOGY | Facility: HOSPITAL | Age: 85
End: 2020-10-05
Payer: MEDICARE

## 2020-10-05 VITALS
WEIGHT: 194 LBS | DIASTOLIC BLOOD PRESSURE: 79 MMHG | OXYGEN SATURATION: 98 % | SYSTOLIC BLOOD PRESSURE: 118 MMHG | TEMPERATURE: 97.2 F | RESPIRATION RATE: 16 BRPM | HEART RATE: 68 BPM

## 2020-10-05 PROCEDURE — 99212 OFFICE O/P EST SF 10 MIN: CPT

## 2020-10-05 RX ORDER — GLIMEPIRIDE 2 MG/1
2 TABLET ORAL
Qty: 90 | Refills: 0 | Status: DISCONTINUED | COMMUNITY
Start: 2020-05-19

## 2020-10-05 RX ORDER — CIPROFLOXACIN HYDROCHLORIDE 500 MG/1
500 TABLET, FILM COATED ORAL
Qty: 21 | Refills: 0 | Status: ACTIVE | COMMUNITY
Start: 2020-07-21

## 2020-10-05 RX ORDER — APIXABAN 2.5 MG/1
2.5 TABLET, FILM COATED ORAL
Qty: 180 | Refills: 0 | Status: ACTIVE | COMMUNITY
Start: 2020-04-21

## 2020-10-05 RX ORDER — FUROSEMIDE 20 MG/1
20 TABLET ORAL
Qty: 90 | Refills: 0 | Status: DISCONTINUED | COMMUNITY
Start: 2020-02-10

## 2020-10-05 RX ORDER — ASPIRIN 81 MG
81 TABLET, DELAYED RELEASE (ENTERIC COATED) ORAL
Refills: 0 | Status: DISCONTINUED | COMMUNITY
End: 2020-10-05

## 2020-10-05 RX ORDER — BLOOD SUGAR DIAGNOSTIC
STRIP MISCELLANEOUS
Qty: 300 | Refills: 0 | Status: ACTIVE | COMMUNITY
Start: 2020-09-22

## 2020-10-05 NOTE — DISEASE MANAGEMENT
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

## 2020-10-05 NOTE — HISTORY OF PRESENT ILLNESS
[FreeTextEntry1] : 10/5/2020 Follow up: Pt returns for 3 year follow up. Tolerating regular food. Denies difficulty swallowing. Lost 10lbs since last year. Pt and wife state he eats well. He followed up with Dr. Arredondo about 4 months ago because a piece of his hearing was stuck in his ear. Has not seen Dr. Urena d/t COVID. \par \par 9/20/19 follow up-   pt has continued hoarseness, but it is modest.  He denies any pain,  difficulty with swallowing, eating or drinking.   pt seen and scoped by Dr. Urena on 6/20/19  .pt sprayed to nose with viscous lidocaine 2 percent.

## 2020-10-05 NOTE — PHYSICAL EXAM
[Normal] : no respiratory distress, lungs were clear to auscultation bilaterally [de-identified] : His weight loss has not been noticable.  [de-identified] : voice is normal, right semi-ptosis, but no nodes appreciated in the neck.  He declined an exam with the scope.  [de-identified] : overall no masses in the neck

## 2020-10-05 NOTE — VITALS
[60: Requires occasional assistance, but is able to care for most of his/her needs] : 60: Requires occasional assistance, but is able to care for most of his/her needs

## 2020-10-22 ENCOUNTER — APPOINTMENT (OUTPATIENT)
Dept: CARDIOLOGY | Facility: CLINIC | Age: 85
End: 2020-10-22
Payer: MEDICARE

## 2020-10-22 PROCEDURE — 93296 REM INTERROG EVL PM/IDS: CPT

## 2020-10-22 PROCEDURE — 93295 DEV INTERROG REMOTE 1/2/MLT: CPT

## 2021-01-21 ENCOUNTER — APPOINTMENT (OUTPATIENT)
Dept: CARDIOLOGY | Facility: CLINIC | Age: 86
End: 2021-01-21
Payer: MEDICARE

## 2021-01-21 PROCEDURE — 93295 DEV INTERROG REMOTE 1/2/MLT: CPT

## 2021-01-21 PROCEDURE — 93296 REM INTERROG EVL PM/IDS: CPT

## 2021-04-16 ENCOUNTER — APPOINTMENT (OUTPATIENT)
Dept: RADIATION ONCOLOGY | Facility: HOSPITAL | Age: 86
End: 2021-04-16
Payer: MEDICARE

## 2021-04-16 VITALS
SYSTOLIC BLOOD PRESSURE: 109 MMHG | WEIGHT: 199.6 LBS | DIASTOLIC BLOOD PRESSURE: 59 MMHG | RESPIRATION RATE: 12 BRPM | OXYGEN SATURATION: 98 % | HEART RATE: 55 BPM | TEMPERATURE: 96.8 F

## 2021-04-16 DIAGNOSIS — C32.1 MALIGNANT NEOPLASM OF SUPRAGLOTTIS: ICD-10-CM

## 2021-04-16 PROCEDURE — 92511 NASOPHARYNGOSCOPY: CPT

## 2021-04-16 PROCEDURE — 99212 OFFICE O/P EST SF 10 MIN: CPT | Mod: 25

## 2021-04-16 NOTE — HISTORY OF PRESENT ILLNESS
[FreeTextEntry1] : 04/16/2021 F/U Patient returns for a four year follow up. His voice remains hoarse, denies any pain, nausea, vomiting or diarrhea he does have some difficulty swallowing certain foods such as peanuts.  Over all he's eating well, no weight loss at this time, he goes between 195lbs - 199lbs. He finds he no longer make the trek for an appointment with Dr Urena in [place].   \par \par 10/5/2020 Follow up: Pt returns for 3 year follow up. Tolerating regular food. Denies difficulty swallowing. Lost 10lbs since last year. Pt and wife state he eats well. He followed up with Dr. Arredondo about 4 months ago because a piece of his hearing was stuck in his ear. Has not seen Dr. Urena d/t KATEID. \par \par 9/20/19 follow up-   pt has continued hoarseness, but it is modest.  He denies any pain,  difficulty with swallowing, eating or drinking.   pt seen and scoped by Dr. Urena on 6/20/19  .pt sprayed to nose with viscous lidocaine 2 percent.

## 2021-04-16 NOTE — PHYSICAL EXAM
[Normal] : no respiratory distress, lungs were clear to auscultation bilaterally [de-identified] : His weight loss has not been noticable.  [de-identified] : voice is normal, right semi-ptosis, but no nodes appreciated in the neck.  Using the fiberoptic scope the larynx was well seen.  There is no anatomic distortionl  The cords are moving well.  There are no suspicious findings.   [de-identified] : overall no masses in the neck

## 2021-04-16 NOTE — DISEASE MANAGEMENT
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

## 2021-04-22 ENCOUNTER — APPOINTMENT (OUTPATIENT)
Dept: CARDIOLOGY | Facility: CLINIC | Age: 86
End: 2021-04-22
Payer: MEDICARE

## 2021-04-22 ENCOUNTER — NON-APPOINTMENT (OUTPATIENT)
Age: 86
End: 2021-04-22

## 2021-04-22 PROCEDURE — 93295 DEV INTERROG REMOTE 1/2/MLT: CPT

## 2021-04-22 PROCEDURE — 93296 REM INTERROG EVL PM/IDS: CPT

## 2021-07-22 ENCOUNTER — NON-APPOINTMENT (OUTPATIENT)
Age: 86
End: 2021-07-22

## 2021-07-22 ENCOUNTER — APPOINTMENT (OUTPATIENT)
Dept: CARDIOLOGY | Facility: CLINIC | Age: 86
End: 2021-07-22
Payer: MEDICARE

## 2021-07-22 PROCEDURE — 93295 DEV INTERROG REMOTE 1/2/MLT: CPT

## 2021-07-22 PROCEDURE — 93296 REM INTERROG EVL PM/IDS: CPT

## 2021-10-21 ENCOUNTER — APPOINTMENT (OUTPATIENT)
Dept: CARDIOLOGY | Facility: CLINIC | Age: 86
End: 2021-10-21
Payer: MEDICARE

## 2021-10-21 ENCOUNTER — NON-APPOINTMENT (OUTPATIENT)
Age: 86
End: 2021-10-21

## 2021-10-21 PROCEDURE — 93296 REM INTERROG EVL PM/IDS: CPT | Mod: NC

## 2021-10-21 PROCEDURE — 93295 DEV INTERROG REMOTE 1/2/MLT: CPT

## 2022-01-01 ENCOUNTER — NON-APPOINTMENT (OUTPATIENT)
Age: 87
End: 2022-01-01

## 2022-01-01 ENCOUNTER — APPOINTMENT (OUTPATIENT)
Dept: CARDIOLOGY | Facility: CLINIC | Age: 87
End: 2022-01-01

## 2022-01-01 ENCOUNTER — APPOINTMENT (OUTPATIENT)
Dept: CARDIOLOGY | Facility: CLINIC | Age: 87
End: 2022-01-01
Payer: MEDICARE

## 2022-01-01 PROCEDURE — 93295 DEV INTERROG REMOTE 1/2/MLT: CPT

## 2022-01-01 PROCEDURE — 93296 REM INTERROG EVL PM/IDS: CPT

## 2022-01-20 ENCOUNTER — NON-APPOINTMENT (OUTPATIENT)
Age: 87
End: 2022-01-20

## 2022-01-20 ENCOUNTER — APPOINTMENT (OUTPATIENT)
Dept: CARDIOLOGY | Facility: CLINIC | Age: 87
End: 2022-01-20
Payer: MEDICARE

## 2022-01-20 PROCEDURE — 93295 DEV INTERROG REMOTE 1/2/MLT: CPT

## 2022-01-20 PROCEDURE — 93296 REM INTERROG EVL PM/IDS: CPT

## 2023-01-01 ENCOUNTER — NON-APPOINTMENT (OUTPATIENT)
Age: 88
End: 2023-01-01

## 2023-01-01 ENCOUNTER — APPOINTMENT (OUTPATIENT)
Dept: CARDIOLOGY | Facility: CLINIC | Age: 88
End: 2023-01-01
Payer: MEDICARE

## 2023-01-01 ENCOUNTER — INPATIENT (INPATIENT)
Facility: HOSPITAL | Age: 88
LOS: 12 days | DRG: 871 | End: 2023-02-26
Attending: INTERNAL MEDICINE | Admitting: INTERNAL MEDICINE
Payer: MEDICARE

## 2023-01-01 VITALS — DIASTOLIC BLOOD PRESSURE: 55 MMHG | SYSTOLIC BLOOD PRESSURE: 92 MMHG | HEART RATE: 100 BPM

## 2023-01-01 VITALS
HEIGHT: 70 IN | WEIGHT: 139.99 LBS | HEART RATE: 92 BPM | TEMPERATURE: 98 F | OXYGEN SATURATION: 91 % | RESPIRATION RATE: 20 BRPM | SYSTOLIC BLOOD PRESSURE: 122 MMHG | DIASTOLIC BLOOD PRESSURE: 82 MMHG

## 2023-01-01 DIAGNOSIS — R13.10 DYSPHAGIA, UNSPECIFIED: ICD-10-CM

## 2023-01-01 DIAGNOSIS — I72.3 ANEURYSM OF ILIAC ARTERY: Chronic | ICD-10-CM

## 2023-01-01 DIAGNOSIS — R06.00 DYSPNEA, UNSPECIFIED: ICD-10-CM

## 2023-01-01 DIAGNOSIS — Z95.1 PRESENCE OF AORTOCORONARY BYPASS GRAFT: Chronic | ICD-10-CM

## 2023-01-01 DIAGNOSIS — Z95.810 PRESENCE OF AUTOMATIC (IMPLANTABLE) CARDIAC DEFIBRILLATOR: Chronic | ICD-10-CM

## 2023-01-01 DIAGNOSIS — J18.9 PNEUMONIA, UNSPECIFIED ORGANISM: ICD-10-CM

## 2023-01-01 DIAGNOSIS — Z93.6 OTHER ARTIFICIAL OPENINGS OF URINARY TRACT STATUS: Chronic | ICD-10-CM

## 2023-01-01 DIAGNOSIS — R45.1 RESTLESSNESS AND AGITATION: ICD-10-CM

## 2023-01-01 DIAGNOSIS — Z51.5 ENCOUNTER FOR PALLIATIVE CARE: ICD-10-CM

## 2023-01-01 DIAGNOSIS — A41.9 SEPSIS, UNSPECIFIED ORGANISM: ICD-10-CM

## 2023-01-01 LAB
-  AMIKACIN: SIGNIFICANT CHANGE UP
-  AMIKACIN: SIGNIFICANT CHANGE UP
-  AMOXICILLIN/CLAVULANIC ACID: SIGNIFICANT CHANGE UP
-  AMPICILLIN/SULBACTAM: SIGNIFICANT CHANGE UP
-  AMPICILLIN: SIGNIFICANT CHANGE UP
-  AZTREONAM: SIGNIFICANT CHANGE UP
-  AZTREONAM: SIGNIFICANT CHANGE UP
-  CEFAZOLIN: SIGNIFICANT CHANGE UP
-  CEFEPIME: SIGNIFICANT CHANGE UP
-  CEFEPIME: SIGNIFICANT CHANGE UP
-  CEFTRIAXONE: SIGNIFICANT CHANGE UP
-  CEFTRIAXONE: SIGNIFICANT CHANGE UP
-  CEFUROXIME: SIGNIFICANT CHANGE UP
-  CIPROFLOXACIN: SIGNIFICANT CHANGE UP
-  CIPROFLOXACIN: SIGNIFICANT CHANGE UP
-  ERTAPENEM: SIGNIFICANT CHANGE UP
-  GENTAMICIN: SIGNIFICANT CHANGE UP
-  GENTAMICIN: SIGNIFICANT CHANGE UP
-  IMIPENEM: SIGNIFICANT CHANGE UP
-  LEVOFLOXACIN: SIGNIFICANT CHANGE UP
-  LEVOFLOXACIN: SIGNIFICANT CHANGE UP
-  MEROPENEM: SIGNIFICANT CHANGE UP
-  MEROPENEM: SIGNIFICANT CHANGE UP
-  NITROFURANTOIN: SIGNIFICANT CHANGE UP
-  PIPERACILLIN/TAZOBACTAM: SIGNIFICANT CHANGE UP
-  PIPERACILLIN/TAZOBACTAM: SIGNIFICANT CHANGE UP
-  TOBRAMYCIN: SIGNIFICANT CHANGE UP
-  TOBRAMYCIN: SIGNIFICANT CHANGE UP
-  TRIMETHOPRIM/SULFAMETHOXAZOLE: SIGNIFICANT CHANGE UP
-  TRIMETHOPRIM/SULFAMETHOXAZOLE: SIGNIFICANT CHANGE UP
A1C WITH ESTIMATED AVERAGE GLUCOSE RESULT: 5.6 % — SIGNIFICANT CHANGE UP (ref 4–5.6)
ALBUMIN SERPL ELPH-MCNC: 2.8 G/DL — LOW (ref 3.5–5.2)
ALBUMIN SERPL ELPH-MCNC: 3 G/DL — LOW (ref 3.5–5.2)
ALBUMIN SERPL ELPH-MCNC: 3 G/DL — LOW (ref 3.5–5.2)
ALBUMIN SERPL ELPH-MCNC: 3.4 G/DL — LOW (ref 3.5–5.2)
ALP SERPL-CCNC: 106 U/L — SIGNIFICANT CHANGE UP (ref 30–115)
ALP SERPL-CCNC: 118 U/L — HIGH (ref 30–115)
ALP SERPL-CCNC: 121 U/L — HIGH (ref 30–115)
ALP SERPL-CCNC: 90 U/L — SIGNIFICANT CHANGE UP (ref 30–115)
ALT FLD-CCNC: 23 U/L — SIGNIFICANT CHANGE UP (ref 0–41)
ALT FLD-CCNC: 23 U/L — SIGNIFICANT CHANGE UP (ref 0–41)
ALT FLD-CCNC: 39 U/L — SIGNIFICANT CHANGE UP (ref 0–41)
ALT FLD-CCNC: 40 U/L — SIGNIFICANT CHANGE UP (ref 0–41)
ANION GAP SERPL CALC-SCNC: 10 MMOL/L — SIGNIFICANT CHANGE UP (ref 7–14)
ANION GAP SERPL CALC-SCNC: 10 MMOL/L — SIGNIFICANT CHANGE UP (ref 7–14)
ANION GAP SERPL CALC-SCNC: 9 MMOL/L — SIGNIFICANT CHANGE UP (ref 7–14)
ANION GAP SERPL CALC-SCNC: 9 MMOL/L — SIGNIFICANT CHANGE UP (ref 7–14)
APPEARANCE UR: ABNORMAL
APTT BLD: 40.4 SEC — HIGH (ref 27–39.2)
AST SERPL-CCNC: 30 U/L — SIGNIFICANT CHANGE UP (ref 0–41)
AST SERPL-CCNC: 36 U/L — SIGNIFICANT CHANGE UP (ref 0–41)
AST SERPL-CCNC: 44 U/L — HIGH (ref 0–41)
AST SERPL-CCNC: 48 U/L — HIGH (ref 0–41)
BACTERIA # UR AUTO: ABNORMAL /HPF
BASE EXCESS BLDA CALC-SCNC: -3.8 MMOL/L — LOW (ref -2–3)
BASE EXCESS BLDV CALC-SCNC: -1.5 MMOL/L — SIGNIFICANT CHANGE UP (ref -2–3)
BASOPHILS # BLD AUTO: 0.01 K/UL — SIGNIFICANT CHANGE UP (ref 0–0.2)
BASOPHILS # BLD AUTO: 0.02 K/UL — SIGNIFICANT CHANGE UP (ref 0–0.2)
BASOPHILS NFR BLD AUTO: 0.1 % — SIGNIFICANT CHANGE UP (ref 0–1)
BASOPHILS NFR BLD AUTO: 0.3 % — SIGNIFICANT CHANGE UP (ref 0–1)
BILIRUB DIRECT SERPL-MCNC: 0.2 MG/DL — SIGNIFICANT CHANGE UP (ref 0–0.3)
BILIRUB INDIRECT FLD-MCNC: 0.2 MG/DL — SIGNIFICANT CHANGE UP (ref 0.2–1.2)
BILIRUB SERPL-MCNC: 0.4 MG/DL — SIGNIFICANT CHANGE UP (ref 0.2–1.2)
BILIRUB SERPL-MCNC: 0.5 MG/DL — SIGNIFICANT CHANGE UP (ref 0.2–1.2)
BILIRUB SERPL-MCNC: 0.5 MG/DL — SIGNIFICANT CHANGE UP (ref 0.2–1.2)
BILIRUB SERPL-MCNC: 0.8 MG/DL — SIGNIFICANT CHANGE UP (ref 0.2–1.2)
BILIRUB UR-MCNC: NEGATIVE — SIGNIFICANT CHANGE UP
BUN SERPL-MCNC: 39 MG/DL — HIGH (ref 10–20)
BUN SERPL-MCNC: 45 MG/DL — HIGH (ref 10–20)
BUN SERPL-MCNC: 49 MG/DL — HIGH (ref 10–20)
BUN SERPL-MCNC: 49 MG/DL — HIGH (ref 10–20)
CA-I SERPL-SCNC: 1.35 MMOL/L — HIGH (ref 1.15–1.33)
CALCIUM SERPL-MCNC: 9.1 MG/DL — SIGNIFICANT CHANGE UP (ref 8.4–10.5)
CALCIUM SERPL-MCNC: 9.5 MG/DL — SIGNIFICANT CHANGE UP (ref 8.4–10.5)
CALCIUM SERPL-MCNC: 9.5 MG/DL — SIGNIFICANT CHANGE UP (ref 8.4–10.5)
CALCIUM SERPL-MCNC: 9.9 MG/DL — SIGNIFICANT CHANGE UP (ref 8.4–10.5)
CHLORIDE SERPL-SCNC: 105 MMOL/L — SIGNIFICANT CHANGE UP (ref 98–110)
CHLORIDE SERPL-SCNC: 108 MMOL/L — SIGNIFICANT CHANGE UP (ref 98–110)
CHLORIDE SERPL-SCNC: 108 MMOL/L — SIGNIFICANT CHANGE UP (ref 98–110)
CHLORIDE SERPL-SCNC: 110 MMOL/L — SIGNIFICANT CHANGE UP (ref 98–110)
CO2 SERPL-SCNC: 20 MMOL/L — SIGNIFICANT CHANGE UP (ref 17–32)
CO2 SERPL-SCNC: 21 MMOL/L — SIGNIFICANT CHANGE UP (ref 17–32)
CO2 SERPL-SCNC: 22 MMOL/L — SIGNIFICANT CHANGE UP (ref 17–32)
CO2 SERPL-SCNC: 25 MMOL/L — SIGNIFICANT CHANGE UP (ref 17–32)
COLOR SPEC: YELLOW — SIGNIFICANT CHANGE UP
CREAT SERPL-MCNC: 1.3 MG/DL — SIGNIFICANT CHANGE UP (ref 0.7–1.5)
CREAT SERPL-MCNC: 1.7 MG/DL — HIGH (ref 0.7–1.5)
CREAT SERPL-MCNC: 2.1 MG/DL — HIGH (ref 0.7–1.5)
CREAT SERPL-MCNC: 2.1 MG/DL — HIGH (ref 0.7–1.5)
CULTURE RESULTS: SIGNIFICANT CHANGE UP
DIFF PNL FLD: ABNORMAL
EGFR: 30 ML/MIN/1.73M2 — LOW
EGFR: 30 ML/MIN/1.73M2 — LOW
EGFR: 38 ML/MIN/1.73M2 — LOW
EGFR: 53 ML/MIN/1.73M2 — LOW
EOSINOPHIL # BLD AUTO: 0.01 K/UL — SIGNIFICANT CHANGE UP (ref 0–0.7)
EOSINOPHIL # BLD AUTO: 0.2 K/UL — SIGNIFICANT CHANGE UP (ref 0–0.7)
EOSINOPHIL NFR BLD AUTO: 0.1 % — SIGNIFICANT CHANGE UP (ref 0–8)
EOSINOPHIL NFR BLD AUTO: 2.9 % — SIGNIFICANT CHANGE UP (ref 0–8)
EPI CELLS # UR: ABNORMAL /HPF
ESTIMATED AVERAGE GLUCOSE: 114 MG/DL — SIGNIFICANT CHANGE UP (ref 68–114)
FLUAV AG NPH QL: SIGNIFICANT CHANGE UP
FLUBV AG NPH QL: SIGNIFICANT CHANGE UP
GAS PNL BLDV: 138 MMOL/L — SIGNIFICANT CHANGE UP (ref 136–145)
GAS PNL BLDV: SIGNIFICANT CHANGE UP
GLUCOSE BLDC GLUCOMTR-MCNC: 101 MG/DL — HIGH (ref 70–99)
GLUCOSE BLDC GLUCOMTR-MCNC: 101 MG/DL — HIGH (ref 70–99)
GLUCOSE BLDC GLUCOMTR-MCNC: 106 MG/DL — HIGH (ref 70–99)
GLUCOSE BLDC GLUCOMTR-MCNC: 112 MG/DL — HIGH (ref 70–99)
GLUCOSE BLDC GLUCOMTR-MCNC: 113 MG/DL — HIGH (ref 70–99)
GLUCOSE BLDC GLUCOMTR-MCNC: 122 MG/DL — HIGH (ref 70–99)
GLUCOSE BLDC GLUCOMTR-MCNC: 123 MG/DL — HIGH (ref 70–99)
GLUCOSE BLDC GLUCOMTR-MCNC: 134 MG/DL — HIGH (ref 70–99)
GLUCOSE BLDC GLUCOMTR-MCNC: 154 MG/DL — HIGH (ref 70–99)
GLUCOSE BLDC GLUCOMTR-MCNC: 157 MG/DL — HIGH (ref 70–99)
GLUCOSE BLDC GLUCOMTR-MCNC: 190 MG/DL — HIGH (ref 70–99)
GLUCOSE BLDC GLUCOMTR-MCNC: 60 MG/DL — LOW (ref 70–99)
GLUCOSE BLDC GLUCOMTR-MCNC: 65 MG/DL — LOW (ref 70–99)
GLUCOSE BLDC GLUCOMTR-MCNC: 68 MG/DL — LOW (ref 70–99)
GLUCOSE BLDC GLUCOMTR-MCNC: 78 MG/DL — SIGNIFICANT CHANGE UP (ref 70–99)
GLUCOSE BLDC GLUCOMTR-MCNC: 80 MG/DL — SIGNIFICANT CHANGE UP (ref 70–99)
GLUCOSE BLDC GLUCOMTR-MCNC: 84 MG/DL — SIGNIFICANT CHANGE UP (ref 70–99)
GLUCOSE BLDC GLUCOMTR-MCNC: 87 MG/DL — SIGNIFICANT CHANGE UP (ref 70–99)
GLUCOSE BLDC GLUCOMTR-MCNC: 87 MG/DL — SIGNIFICANT CHANGE UP (ref 70–99)
GLUCOSE BLDC GLUCOMTR-MCNC: 88 MG/DL — SIGNIFICANT CHANGE UP (ref 70–99)
GLUCOSE BLDC GLUCOMTR-MCNC: 88 MG/DL — SIGNIFICANT CHANGE UP (ref 70–99)
GLUCOSE BLDC GLUCOMTR-MCNC: 91 MG/DL — SIGNIFICANT CHANGE UP (ref 70–99)
GLUCOSE BLDC GLUCOMTR-MCNC: 91 MG/DL — SIGNIFICANT CHANGE UP (ref 70–99)
GLUCOSE BLDC GLUCOMTR-MCNC: 93 MG/DL — SIGNIFICANT CHANGE UP (ref 70–99)
GLUCOSE SERPL-MCNC: 104 MG/DL — HIGH (ref 70–99)
GLUCOSE SERPL-MCNC: 122 MG/DL — HIGH (ref 70–99)
GLUCOSE SERPL-MCNC: 85 MG/DL — SIGNIFICANT CHANGE UP (ref 70–99)
GLUCOSE SERPL-MCNC: 97 MG/DL — SIGNIFICANT CHANGE UP (ref 70–99)
GLUCOSE UR QL: NEGATIVE MG/DL — SIGNIFICANT CHANGE UP
HCO3 BLDA-SCNC: 22 MMOL/L — SIGNIFICANT CHANGE UP (ref 21–28)
HCO3 BLDV-SCNC: 26 MMOL/L — SIGNIFICANT CHANGE UP (ref 22–29)
HCT VFR BLD CALC: 36.3 % — LOW (ref 42–52)
HCT VFR BLD CALC: 36.6 % — LOW (ref 42–52)
HCT VFR BLD CALC: 36.9 % — LOW (ref 42–52)
HCT VFR BLD CALC: 39.3 % — LOW (ref 42–52)
HCT VFR BLDA CALC: 35 % — LOW (ref 39–51)
HGB BLD CALC-MCNC: 11.8 G/DL — LOW (ref 12.6–17.4)
HGB BLD-MCNC: 10.4 G/DL — LOW (ref 14–18)
HGB BLD-MCNC: 10.9 G/DL — LOW (ref 14–18)
HGB BLD-MCNC: 11 G/DL — LOW (ref 14–18)
HGB BLD-MCNC: 11.5 G/DL — LOW (ref 14–18)
IMM GRANULOCYTES NFR BLD AUTO: 0.4 % — HIGH (ref 0.1–0.3)
IMM GRANULOCYTES NFR BLD AUTO: 1.2 % — HIGH (ref 0.1–0.3)
INR BLD: 1.54 RATIO — HIGH (ref 0.65–1.3)
KETONES UR-MCNC: NEGATIVE — SIGNIFICANT CHANGE UP
LACTATE BLDV-MCNC: 3.1 MMOL/L — HIGH (ref 0.5–2)
LACTATE SERPL-SCNC: 0.9 MMOL/L — SIGNIFICANT CHANGE UP (ref 0.7–2)
LACTATE SERPL-SCNC: 1.6 MMOL/L — SIGNIFICANT CHANGE UP (ref 0.7–2)
LEUKOCYTE ESTERASE UR-ACNC: ABNORMAL
LYMPHOCYTES # BLD AUTO: 0.27 K/UL — LOW (ref 1.2–3.4)
LYMPHOCYTES # BLD AUTO: 0.5 K/UL — LOW (ref 1.2–3.4)
LYMPHOCYTES # BLD AUTO: 2.1 % — LOW (ref 20.5–51.1)
LYMPHOCYTES # BLD AUTO: 7.2 % — LOW (ref 20.5–51.1)
MAGNESIUM SERPL-MCNC: 1.8 MG/DL — SIGNIFICANT CHANGE UP (ref 1.8–2.4)
MAGNESIUM SERPL-MCNC: 1.9 MG/DL — SIGNIFICANT CHANGE UP (ref 1.8–2.4)
MAGNESIUM SERPL-MCNC: 2 MG/DL — SIGNIFICANT CHANGE UP (ref 1.8–2.4)
MCHC RBC-ENTMCNC: 20.5 PG — LOW (ref 27–31)
MCHC RBC-ENTMCNC: 20.5 PG — LOW (ref 27–31)
MCHC RBC-ENTMCNC: 20.6 PG — LOW (ref 27–31)
MCHC RBC-ENTMCNC: 20.8 PG — LOW (ref 27–31)
MCHC RBC-ENTMCNC: 28.7 G/DL — LOW (ref 32–37)
MCHC RBC-ENTMCNC: 29.3 G/DL — LOW (ref 32–37)
MCHC RBC-ENTMCNC: 29.8 G/DL — LOW (ref 32–37)
MCHC RBC-ENTMCNC: 29.8 G/DL — LOW (ref 32–37)
MCV RBC AUTO: 69.2 FL — LOW (ref 80–94)
MCV RBC AUTO: 69.7 FL — LOW (ref 80–94)
MCV RBC AUTO: 70.2 FL — LOW (ref 80–94)
MCV RBC AUTO: 71.6 FL — LOW (ref 80–94)
METHOD TYPE: SIGNIFICANT CHANGE UP
METHOD TYPE: SIGNIFICANT CHANGE UP
MONOCYTES # BLD AUTO: 0.58 K/UL — SIGNIFICANT CHANGE UP (ref 0.1–0.6)
MONOCYTES # BLD AUTO: 0.6 K/UL — SIGNIFICANT CHANGE UP (ref 0.1–0.6)
MONOCYTES NFR BLD AUTO: 4.7 % — SIGNIFICANT CHANGE UP (ref 1.7–9.3)
MONOCYTES NFR BLD AUTO: 8.4 % — SIGNIFICANT CHANGE UP (ref 1.7–9.3)
MRSA PCR RESULT.: NEGATIVE — SIGNIFICANT CHANGE UP
NEUTROPHILS # BLD AUTO: 11.7 K/UL — HIGH (ref 1.4–6.5)
NEUTROPHILS # BLD AUTO: 5.54 K/UL — SIGNIFICANT CHANGE UP (ref 1.4–6.5)
NEUTROPHILS NFR BLD AUTO: 80 % — HIGH (ref 42.2–75.2)
NEUTROPHILS NFR BLD AUTO: 92.6 % — HIGH (ref 42.2–75.2)
NITRITE UR-MCNC: NEGATIVE — SIGNIFICANT CHANGE UP
NRBC # BLD: 0 /100 WBCS — SIGNIFICANT CHANGE UP (ref 0–0)
NT-PROBNP SERPL-SCNC: HIGH PG/ML (ref 0–300)
OB PNL STL: NEGATIVE — SIGNIFICANT CHANGE UP
ORGANISM # SPEC MICROSCOPIC CNT: SIGNIFICANT CHANGE UP
PCO2 BLDA: 39 MMHG — SIGNIFICANT CHANGE UP (ref 35–48)
PCO2 BLDV: 57 MMHG — HIGH (ref 42–55)
PH BLDA: 7.35 — SIGNIFICANT CHANGE UP (ref 7.35–7.45)
PH BLDV: 7.27 — LOW (ref 7.32–7.43)
PH UR: 6.5 — SIGNIFICANT CHANGE UP (ref 5–8)
PHOSPHATE SERPL-MCNC: 4.2 MG/DL — SIGNIFICANT CHANGE UP (ref 2.1–4.9)
PLATELET # BLD AUTO: 153 K/UL — SIGNIFICANT CHANGE UP (ref 130–400)
PLATELET # BLD AUTO: 172 K/UL — SIGNIFICANT CHANGE UP (ref 130–400)
PLATELET # BLD AUTO: 183 K/UL — SIGNIFICANT CHANGE UP (ref 130–400)
PLATELET # BLD AUTO: 187 K/UL — SIGNIFICANT CHANGE UP (ref 130–400)
PO2 BLDA: 65 MMHG — LOW (ref 83–108)
PO2 BLDV: 25 MMHG — SIGNIFICANT CHANGE UP
POTASSIUM BLDV-SCNC: 5.1 MMOL/L — SIGNIFICANT CHANGE UP (ref 3.5–5.1)
POTASSIUM SERPL-MCNC: 4.8 MMOL/L — SIGNIFICANT CHANGE UP (ref 3.5–5)
POTASSIUM SERPL-MCNC: 5 MMOL/L — SIGNIFICANT CHANGE UP (ref 3.5–5)
POTASSIUM SERPL-MCNC: 5.1 MMOL/L — HIGH (ref 3.5–5)
POTASSIUM SERPL-MCNC: 5.4 MMOL/L — HIGH (ref 3.5–5)
POTASSIUM SERPL-SCNC: 4.8 MMOL/L — SIGNIFICANT CHANGE UP (ref 3.5–5)
POTASSIUM SERPL-SCNC: 5 MMOL/L — SIGNIFICANT CHANGE UP (ref 3.5–5)
POTASSIUM SERPL-SCNC: 5.1 MMOL/L — HIGH (ref 3.5–5)
POTASSIUM SERPL-SCNC: 5.4 MMOL/L — HIGH (ref 3.5–5)
PROCALCITONIN SERPL-MCNC: 0.55 NG/ML — HIGH (ref 0.02–0.1)
PROT SERPL-MCNC: 6.1 G/DL — SIGNIFICANT CHANGE UP (ref 6–8)
PROT SERPL-MCNC: 6.6 G/DL — SIGNIFICANT CHANGE UP (ref 6–8)
PROT SERPL-MCNC: 6.8 G/DL — SIGNIFICANT CHANGE UP (ref 6–8)
PROT SERPL-MCNC: 7 G/DL — SIGNIFICANT CHANGE UP (ref 6–8)
PROT UR-MCNC: 30 MG/DL
PROTHROM AB SERPL-ACNC: 17.8 SEC — HIGH (ref 9.95–12.87)
RBC # BLD: 5.07 M/UL — SIGNIFICANT CHANGE UP (ref 4.7–6.1)
RBC # BLD: 5.25 M/UL — SIGNIFICANT CHANGE UP (ref 4.7–6.1)
RBC # BLD: 5.33 M/UL — SIGNIFICANT CHANGE UP (ref 4.7–6.1)
RBC # BLD: 5.6 M/UL — SIGNIFICANT CHANGE UP (ref 4.7–6.1)
RBC # FLD: 17 % — HIGH (ref 11.5–14.5)
RBC # FLD: 17.3 % — HIGH (ref 11.5–14.5)
RBC # FLD: 17.3 % — HIGH (ref 11.5–14.5)
RBC # FLD: 17.7 % — HIGH (ref 11.5–14.5)
RBC CASTS # UR COMP ASSIST: ABNORMAL /HPF
RSV RNA NPH QL NAA+NON-PROBE: SIGNIFICANT CHANGE UP
SAO2 % BLDA: 94.7 % — SIGNIFICANT CHANGE UP (ref 94–98)
SAO2 % BLDV: 37.6 % — SIGNIFICANT CHANGE UP
SARS-COV-2 RNA SPEC QL NAA+PROBE: SIGNIFICANT CHANGE UP
SODIUM SERPL-SCNC: 138 MMOL/L — SIGNIFICANT CHANGE UP (ref 135–146)
SODIUM SERPL-SCNC: 139 MMOL/L — SIGNIFICANT CHANGE UP (ref 135–146)
SODIUM SERPL-SCNC: 140 MMOL/L — SIGNIFICANT CHANGE UP (ref 135–146)
SODIUM SERPL-SCNC: 140 MMOL/L — SIGNIFICANT CHANGE UP (ref 135–146)
SP GR SPEC: 1.02 — SIGNIFICANT CHANGE UP (ref 1.01–1.03)
SPECIMEN SOURCE: SIGNIFICANT CHANGE UP
TROPONIN T SERPL-MCNC: 0.02 NG/ML — HIGH
TROPONIN T SERPL-MCNC: 0.03 NG/ML — CRITICAL HIGH
UROBILINOGEN FLD QL: 1 MG/DL
WBC # BLD: 12.64 K/UL — HIGH (ref 4.8–10.8)
WBC # BLD: 6.39 K/UL — SIGNIFICANT CHANGE UP (ref 4.8–10.8)
WBC # BLD: 6.45 K/UL — SIGNIFICANT CHANGE UP (ref 4.8–10.8)
WBC # BLD: 6.92 K/UL — SIGNIFICANT CHANGE UP (ref 4.8–10.8)
WBC # FLD AUTO: 12.64 K/UL — HIGH (ref 4.8–10.8)
WBC # FLD AUTO: 6.39 K/UL — SIGNIFICANT CHANGE UP (ref 4.8–10.8)
WBC # FLD AUTO: 6.45 K/UL — SIGNIFICANT CHANGE UP (ref 4.8–10.8)
WBC # FLD AUTO: 6.92 K/UL — SIGNIFICANT CHANGE UP (ref 4.8–10.8)
WBC UR QL: >50 /HPF

## 2023-01-01 PROCEDURE — 36415 COLL VENOUS BLD VENIPUNCTURE: CPT

## 2023-01-01 PROCEDURE — 93010 ELECTROCARDIOGRAM REPORT: CPT

## 2023-01-01 PROCEDURE — 93296 REM INTERROG EVL PM/IDS: CPT

## 2023-01-01 PROCEDURE — 84484 ASSAY OF TROPONIN QUANT: CPT

## 2023-01-01 PROCEDURE — 83036 HEMOGLOBIN GLYCOSYLATED A1C: CPT

## 2023-01-01 PROCEDURE — 97162 PT EVAL MOD COMPLEX 30 MIN: CPT | Mod: GP

## 2023-01-01 PROCEDURE — 71045 X-RAY EXAM CHEST 1 VIEW: CPT | Mod: 26

## 2023-01-01 PROCEDURE — 92611 MOTION FLUOROSCOPY/SWALLOW: CPT | Mod: GN

## 2023-01-01 PROCEDURE — 93306 TTE W/DOPPLER COMPLETE: CPT

## 2023-01-01 PROCEDURE — 99291 CRITICAL CARE FIRST HOUR: CPT

## 2023-01-01 PROCEDURE — 74230 X-RAY XM SWLNG FUNCJ C+: CPT

## 2023-01-01 PROCEDURE — 99498 ADVNCD CARE PLAN ADDL 30 MIN: CPT | Mod: 95

## 2023-01-01 PROCEDURE — 85027 COMPLETE CBC AUTOMATED: CPT

## 2023-01-01 PROCEDURE — 99232 SBSQ HOSP IP/OBS MODERATE 35: CPT

## 2023-01-01 PROCEDURE — 99231 SBSQ HOSP IP/OBS SF/LOW 25: CPT

## 2023-01-01 PROCEDURE — 82803 BLOOD GASES ANY COMBINATION: CPT

## 2023-01-01 PROCEDURE — 82270 OCCULT BLOOD FECES: CPT

## 2023-01-01 PROCEDURE — 99233 SBSQ HOSP IP/OBS HIGH 50: CPT

## 2023-01-01 PROCEDURE — 87640 STAPH A DNA AMP PROBE: CPT

## 2023-01-01 PROCEDURE — G0427: CPT | Mod: 95

## 2023-01-01 PROCEDURE — 83735 ASSAY OF MAGNESIUM: CPT

## 2023-01-01 PROCEDURE — 76770 US EXAM ABDO BACK WALL COMP: CPT | Mod: 26

## 2023-01-01 PROCEDURE — 93306 TTE W/DOPPLER COMPLETE: CPT | Mod: 26

## 2023-01-01 PROCEDURE — 83605 ASSAY OF LACTIC ACID: CPT

## 2023-01-01 PROCEDURE — 74230 X-RAY XM SWLNG FUNCJ C+: CPT | Mod: 26

## 2023-01-01 PROCEDURE — 82962 GLUCOSE BLOOD TEST: CPT

## 2023-01-01 PROCEDURE — 87641 MR-STAPH DNA AMP PROBE: CPT

## 2023-01-01 PROCEDURE — 84145 PROCALCITONIN (PCT): CPT

## 2023-01-01 PROCEDURE — 76770 US EXAM ABDO BACK WALL COMP: CPT

## 2023-01-01 PROCEDURE — 97110 THERAPEUTIC EXERCISES: CPT | Mod: GP

## 2023-01-01 PROCEDURE — 93005 ELECTROCARDIOGRAM TRACING: CPT

## 2023-01-01 PROCEDURE — 82248 BILIRUBIN DIRECT: CPT

## 2023-01-01 PROCEDURE — 85025 COMPLETE CBC W/AUTO DIFF WBC: CPT

## 2023-01-01 PROCEDURE — 73590 X-RAY EXAM OF LOWER LEG: CPT | Mod: 26,LT

## 2023-01-01 PROCEDURE — 99222 1ST HOSP IP/OBS MODERATE 55: CPT

## 2023-01-01 PROCEDURE — 97116 GAIT TRAINING THERAPY: CPT | Mod: GP

## 2023-01-01 PROCEDURE — 92610 EVALUATE SWALLOWING FUNCTION: CPT | Mod: GN

## 2023-01-01 PROCEDURE — 80053 COMPREHEN METABOLIC PANEL: CPT

## 2023-01-01 PROCEDURE — 71045 X-RAY EXAM CHEST 1 VIEW: CPT

## 2023-01-01 PROCEDURE — 99497 ADVNCD CARE PLAN 30 MIN: CPT | Mod: 95

## 2023-01-01 PROCEDURE — 92526 ORAL FUNCTION THERAPY: CPT | Mod: GN

## 2023-01-01 PROCEDURE — 84100 ASSAY OF PHOSPHORUS: CPT

## 2023-01-01 PROCEDURE — G0408: CPT | Mod: 95

## 2023-01-01 PROCEDURE — 93295 DEV INTERROG REMOTE 1/2/MLT: CPT

## 2023-01-01 RX ORDER — BACITRACIN AND POLYMYXIN B SULFATE 500; 10000 [USP'U]/G; [USP'U]/G
1 OINTMENT TOPICAL DAILY
Refills: 0 | Status: DISCONTINUED | OUTPATIENT
Start: 2023-01-01 | End: 2023-01-01

## 2023-01-01 RX ORDER — SODIUM CHLORIDE 9 MG/ML
500 INJECTION INTRAMUSCULAR; INTRAVENOUS; SUBCUTANEOUS ONCE
Refills: 0 | Status: COMPLETED | OUTPATIENT
Start: 2023-01-01 | End: 2023-01-01

## 2023-01-01 RX ORDER — SODIUM CHLORIDE 9 MG/ML
1000 INJECTION INTRAMUSCULAR; INTRAVENOUS; SUBCUTANEOUS
Refills: 0 | Status: DISCONTINUED | OUTPATIENT
Start: 2023-01-01 | End: 2023-01-01

## 2023-01-01 RX ORDER — CEFEPIME 1 G/1
1000 INJECTION, POWDER, FOR SOLUTION INTRAMUSCULAR; INTRAVENOUS ONCE
Refills: 0 | Status: COMPLETED | OUTPATIENT
Start: 2023-01-01 | End: 2023-01-01

## 2023-01-01 RX ORDER — CEFEPIME 1 G/1
INJECTION, POWDER, FOR SOLUTION INTRAMUSCULAR; INTRAVENOUS
Refills: 0 | Status: DISCONTINUED | OUTPATIENT
Start: 2023-01-01 | End: 2023-01-01

## 2023-01-01 RX ORDER — FUROSEMIDE 40 MG
1 TABLET ORAL
Qty: 0 | Refills: 0 | DISCHARGE

## 2023-01-01 RX ORDER — BACITRACIN ZINC NEOMYCIN SULFATE POLYMYXIN B SULFATE 400; 3.5; 5 [IU]/G; MG/G; [IU]/G
1 OINTMENT TOPICAL DAILY
Refills: 0 | Status: DISCONTINUED | OUTPATIENT
Start: 2023-01-01 | End: 2023-01-01

## 2023-01-01 RX ORDER — AZITHROMYCIN 500 MG/1
500 TABLET, FILM COATED ORAL ONCE
Refills: 0 | Status: COMPLETED | OUTPATIENT
Start: 2023-01-01 | End: 2023-01-01

## 2023-01-01 RX ORDER — HALOPERIDOL DECANOATE 100 MG/ML
0.5 INJECTION INTRAMUSCULAR EVERY 6 HOURS
Refills: 0 | Status: DISCONTINUED | OUTPATIENT
Start: 2023-01-01 | End: 2023-01-01

## 2023-01-01 RX ORDER — METOPROLOL TARTRATE 50 MG
12.5 TABLET ORAL
Refills: 0 | Status: DISCONTINUED | OUTPATIENT
Start: 2023-01-01 | End: 2023-01-01

## 2023-01-01 RX ORDER — APIXABAN 2.5 MG/1
2.5 TABLET, FILM COATED ORAL EVERY 12 HOURS
Refills: 0 | Status: DISCONTINUED | OUTPATIENT
Start: 2023-01-01 | End: 2023-01-01

## 2023-01-01 RX ORDER — HALOPERIDOL DECANOATE 100 MG/ML
0.5 INJECTION INTRAMUSCULAR EVERY 12 HOURS
Refills: 0 | Status: DISCONTINUED | OUTPATIENT
Start: 2023-01-01 | End: 2023-01-01

## 2023-01-01 RX ORDER — NOREPINEPHRINE BITARTRATE/D5W 8 MG/250ML
0.05 PLASTIC BAG, INJECTION (ML) INTRAVENOUS
Qty: 8 | Refills: 0 | Status: DISCONTINUED | OUTPATIENT
Start: 2023-01-01 | End: 2023-01-01

## 2023-01-01 RX ORDER — VANCOMYCIN HCL 1 G
1000 VIAL (EA) INTRAVENOUS EVERY 24 HOURS
Refills: 0 | Status: DISCONTINUED | OUTPATIENT
Start: 2023-01-01 | End: 2023-01-01

## 2023-01-01 RX ORDER — PANTOPRAZOLE SODIUM 20 MG/1
40 TABLET, DELAYED RELEASE ORAL
Refills: 0 | Status: DISCONTINUED | OUTPATIENT
Start: 2023-01-01 | End: 2023-01-01

## 2023-01-01 RX ORDER — CEFTRIAXONE 500 MG/1
1000 INJECTION, POWDER, FOR SOLUTION INTRAMUSCULAR; INTRAVENOUS ONCE
Refills: 0 | Status: COMPLETED | OUTPATIENT
Start: 2023-01-01 | End: 2023-01-01

## 2023-01-01 RX ORDER — VANCOMYCIN HCL 1 G
1000 VIAL (EA) INTRAVENOUS ONCE
Refills: 0 | Status: COMPLETED | OUTPATIENT
Start: 2023-01-01 | End: 2023-01-01

## 2023-01-01 RX ORDER — CEFEPIME 1 G/1
1000 INJECTION, POWDER, FOR SOLUTION INTRAMUSCULAR; INTRAVENOUS EVERY 12 HOURS
Refills: 0 | Status: DISCONTINUED | OUTPATIENT
Start: 2023-01-01 | End: 2023-01-01

## 2023-01-01 RX ORDER — GLIMEPIRIDE 1 MG
1 TABLET ORAL
Qty: 0 | Refills: 0 | DISCHARGE

## 2023-01-01 RX ORDER — CHLORHEXIDINE GLUCONATE 213 G/1000ML
1 SOLUTION TOPICAL
Refills: 0 | Status: DISCONTINUED | OUTPATIENT
Start: 2023-01-01 | End: 2023-01-01

## 2023-01-01 RX ORDER — SODIUM ZIRCONIUM CYCLOSILICATE 10 G/10G
10 POWDER, FOR SUSPENSION ORAL ONCE
Refills: 0 | Status: COMPLETED | OUTPATIENT
Start: 2023-01-01 | End: 2023-01-01

## 2023-01-01 RX ORDER — CEFEPIME 1 G/1
1000 INJECTION, POWDER, FOR SOLUTION INTRAMUSCULAR; INTRAVENOUS EVERY 8 HOURS
Refills: 0 | Status: DISCONTINUED | OUTPATIENT
Start: 2023-01-01 | End: 2023-01-01

## 2023-01-01 RX ORDER — ATORVASTATIN CALCIUM 80 MG/1
1 TABLET, FILM COATED ORAL
Qty: 0 | Refills: 0 | DISCHARGE

## 2023-01-01 RX ORDER — HYDROMORPHONE HYDROCHLORIDE 2 MG/ML
0.5 INJECTION INTRAMUSCULAR; INTRAVENOUS; SUBCUTANEOUS
Refills: 0 | Status: DISCONTINUED | OUTPATIENT
Start: 2023-01-01 | End: 2023-01-01

## 2023-01-01 RX ORDER — ACETAMINOPHEN 500 MG
975 TABLET ORAL ONCE
Refills: 0 | Status: COMPLETED | OUTPATIENT
Start: 2023-01-01 | End: 2023-01-01

## 2023-01-01 RX ORDER — VANCOMYCIN HCL 1 G
VIAL (EA) INTRAVENOUS
Refills: 0 | Status: DISCONTINUED | OUTPATIENT
Start: 2023-01-01 | End: 2023-01-01

## 2023-01-01 RX ORDER — METOPROLOL TARTRATE 50 MG
0.5 TABLET ORAL
Qty: 0 | Refills: 0 | DISCHARGE

## 2023-01-01 RX ORDER — INSULIN LISPRO 100/ML
VIAL (ML) SUBCUTANEOUS
Refills: 0 | Status: DISCONTINUED | OUTPATIENT
Start: 2023-01-01 | End: 2023-01-01

## 2023-01-01 RX ORDER — INFLUENZA VIRUS VACCINE 15; 15; 15; 15 UG/.5ML; UG/.5ML; UG/.5ML; UG/.5ML
0.7 SUSPENSION INTRAMUSCULAR ONCE
Refills: 0 | Status: DISCONTINUED | OUTPATIENT
Start: 2023-01-01 | End: 2023-01-01

## 2023-01-01 RX ORDER — ACETAMINOPHEN 500 MG
325 TABLET ORAL EVERY 4 HOURS
Refills: 0 | Status: DISCONTINUED | OUTPATIENT
Start: 2023-01-01 | End: 2023-01-01

## 2023-01-01 RX ADMIN — HALOPERIDOL DECANOATE 0.5 MILLIGRAM(S): 100 INJECTION INTRAMUSCULAR at 06:03

## 2023-01-01 RX ADMIN — HALOPERIDOL DECANOATE 0.5 MILLIGRAM(S): 100 INJECTION INTRAMUSCULAR at 17:38

## 2023-01-01 RX ADMIN — BACITRACIN ZINC NEOMYCIN SULFATE POLYMYXIN B SULFATE 1 APPLICATION(S): 400; 3.5; 5 OINTMENT TOPICAL at 12:03

## 2023-01-01 RX ADMIN — Medication 1000 MILLIGRAM(S): at 05:03

## 2023-01-01 RX ADMIN — SODIUM CHLORIDE 1000 MILLILITER(S): 9 INJECTION INTRAMUSCULAR; INTRAVENOUS; SUBCUTANEOUS at 01:47

## 2023-01-01 RX ADMIN — Medication 12.5 MILLIGRAM(S): at 18:21

## 2023-01-01 RX ADMIN — AZITHROMYCIN 255 MILLIGRAM(S): 500 TABLET, FILM COATED ORAL at 01:04

## 2023-01-01 RX ADMIN — Medication 325 MILLIGRAM(S): at 05:41

## 2023-01-01 RX ADMIN — APIXABAN 2.5 MILLIGRAM(S): 2.5 TABLET, FILM COATED ORAL at 05:31

## 2023-01-01 RX ADMIN — HALOPERIDOL DECANOATE 0.5 MILLIGRAM(S): 100 INJECTION INTRAMUSCULAR at 17:08

## 2023-01-01 RX ADMIN — CEFTRIAXONE 100 MILLIGRAM(S): 500 INJECTION, POWDER, FOR SOLUTION INTRAMUSCULAR; INTRAVENOUS at 00:34

## 2023-01-01 RX ADMIN — SODIUM ZIRCONIUM CYCLOSILICATE 10 GRAM(S): 10 POWDER, FOR SUSPENSION ORAL at 12:23

## 2023-01-01 RX ADMIN — HALOPERIDOL DECANOATE 0.5 MILLIGRAM(S): 100 INJECTION INTRAMUSCULAR at 08:35

## 2023-01-01 RX ADMIN — CEFEPIME 100 MILLIGRAM(S): 1 INJECTION, POWDER, FOR SOLUTION INTRAMUSCULAR; INTRAVENOUS at 18:07

## 2023-01-01 RX ADMIN — Medication 12.5 MILLIGRAM(S): at 17:08

## 2023-01-01 RX ADMIN — Medication 325 MILLIGRAM(S): at 06:54

## 2023-01-01 RX ADMIN — CHLORHEXIDINE GLUCONATE 1 APPLICATION(S): 213 SOLUTION TOPICAL at 05:35

## 2023-01-01 RX ADMIN — APIXABAN 2.5 MILLIGRAM(S): 2.5 TABLET, FILM COATED ORAL at 05:40

## 2023-01-01 RX ADMIN — Medication 12.5 MILLIGRAM(S): at 06:04

## 2023-01-01 RX ADMIN — SODIUM CHLORIDE 75 MILLILITER(S): 9 INJECTION INTRAMUSCULAR; INTRAVENOUS; SUBCUTANEOUS at 04:49

## 2023-01-01 RX ADMIN — BACITRACIN ZINC NEOMYCIN SULFATE POLYMYXIN B SULFATE 1 APPLICATION(S): 400; 3.5; 5 OINTMENT TOPICAL at 12:27

## 2023-01-01 RX ADMIN — APIXABAN 2.5 MILLIGRAM(S): 2.5 TABLET, FILM COATED ORAL at 17:01

## 2023-01-01 RX ADMIN — HALOPERIDOL DECANOATE 0.5 MILLIGRAM(S): 100 INJECTION INTRAMUSCULAR at 05:09

## 2023-01-01 RX ADMIN — APIXABAN 2.5 MILLIGRAM(S): 2.5 TABLET, FILM COATED ORAL at 17:56

## 2023-01-01 RX ADMIN — HALOPERIDOL DECANOATE 0.5 MILLIGRAM(S): 100 INJECTION INTRAMUSCULAR at 09:16

## 2023-01-01 RX ADMIN — CHLORHEXIDINE GLUCONATE 1 APPLICATION(S): 213 SOLUTION TOPICAL at 06:09

## 2023-01-01 RX ADMIN — HALOPERIDOL DECANOATE 0.5 MILLIGRAM(S): 100 INJECTION INTRAMUSCULAR at 07:02

## 2023-01-01 RX ADMIN — Medication 12.5 MILLIGRAM(S): at 05:05

## 2023-01-01 RX ADMIN — APIXABAN 2.5 MILLIGRAM(S): 2.5 TABLET, FILM COATED ORAL at 17:27

## 2023-01-01 RX ADMIN — HALOPERIDOL DECANOATE 0.5 MILLIGRAM(S): 100 INJECTION INTRAMUSCULAR at 18:26

## 2023-01-01 RX ADMIN — APIXABAN 2.5 MILLIGRAM(S): 2.5 TABLET, FILM COATED ORAL at 05:05

## 2023-01-01 RX ADMIN — CEFEPIME 100 MILLIGRAM(S): 1 INJECTION, POWDER, FOR SOLUTION INTRAMUSCULAR; INTRAVENOUS at 05:05

## 2023-01-01 RX ADMIN — Medication 0.5 MILLIGRAM(S): at 11:20

## 2023-01-01 RX ADMIN — HALOPERIDOL DECANOATE 0.5 MILLIGRAM(S): 100 INJECTION INTRAMUSCULAR at 05:07

## 2023-01-01 RX ADMIN — APIXABAN 2.5 MILLIGRAM(S): 2.5 TABLET, FILM COATED ORAL at 06:03

## 2023-01-01 RX ADMIN — APIXABAN 2.5 MILLIGRAM(S): 2.5 TABLET, FILM COATED ORAL at 18:26

## 2023-01-01 RX ADMIN — Medication 12.5 MILLIGRAM(S): at 17:27

## 2023-01-01 RX ADMIN — HALOPERIDOL DECANOATE 0.5 MILLIGRAM(S): 100 INJECTION INTRAMUSCULAR at 20:15

## 2023-01-01 RX ADMIN — CEFEPIME 100 MILLIGRAM(S): 1 INJECTION, POWDER, FOR SOLUTION INTRAMUSCULAR; INTRAVENOUS at 05:03

## 2023-01-01 RX ADMIN — Medication 5.95 MICROGRAM(S)/KG/MIN: at 21:18

## 2023-01-01 RX ADMIN — Medication 975 MILLIGRAM(S): at 00:34

## 2023-01-01 RX ADMIN — BACITRACIN ZINC NEOMYCIN SULFATE POLYMYXIN B SULFATE 1 APPLICATION(S): 400; 3.5; 5 OINTMENT TOPICAL at 12:15

## 2023-01-01 RX ADMIN — Medication 5.95 MICROGRAM(S)/KG/MIN: at 03:12

## 2023-01-01 RX ADMIN — HALOPERIDOL DECANOATE 0.5 MILLIGRAM(S): 100 INJECTION INTRAMUSCULAR at 17:44

## 2023-01-01 RX ADMIN — Medication 12.5 MILLIGRAM(S): at 05:31

## 2023-01-01 RX ADMIN — APIXABAN 2.5 MILLIGRAM(S): 2.5 TABLET, FILM COATED ORAL at 06:57

## 2023-01-01 RX ADMIN — BACITRACIN ZINC NEOMYCIN SULFATE POLYMYXIN B SULFATE 1 APPLICATION(S): 400; 3.5; 5 OINTMENT TOPICAL at 11:43

## 2023-01-01 RX ADMIN — BACITRACIN ZINC NEOMYCIN SULFATE POLYMYXIN B SULFATE 1 APPLICATION(S): 400; 3.5; 5 OINTMENT TOPICAL at 18:23

## 2023-01-01 RX ADMIN — PANTOPRAZOLE SODIUM 40 MILLIGRAM(S): 20 TABLET, DELAYED RELEASE ORAL at 06:05

## 2023-01-01 RX ADMIN — APIXABAN 2.5 MILLIGRAM(S): 2.5 TABLET, FILM COATED ORAL at 18:21

## 2023-01-01 RX ADMIN — PANTOPRAZOLE SODIUM 40 MILLIGRAM(S): 20 TABLET, DELAYED RELEASE ORAL at 05:31

## 2023-01-01 RX ADMIN — APIXABAN 2.5 MILLIGRAM(S): 2.5 TABLET, FILM COATED ORAL at 05:29

## 2023-01-01 RX ADMIN — Medication 12.5 MILLIGRAM(S): at 06:57

## 2023-01-01 RX ADMIN — BACITRACIN ZINC NEOMYCIN SULFATE POLYMYXIN B SULFATE 1 APPLICATION(S): 400; 3.5; 5 OINTMENT TOPICAL at 12:30

## 2023-01-01 RX ADMIN — SODIUM CHLORIDE 40 MILLILITER(S): 9 INJECTION INTRAMUSCULAR; INTRAVENOUS; SUBCUTANEOUS at 11:43

## 2023-01-01 RX ADMIN — BACITRACIN ZINC NEOMYCIN SULFATE POLYMYXIN B SULFATE 1 APPLICATION(S): 400; 3.5; 5 OINTMENT TOPICAL at 11:53

## 2023-01-01 RX ADMIN — HALOPERIDOL DECANOATE 0.5 MILLIGRAM(S): 100 INJECTION INTRAMUSCULAR at 17:24

## 2023-01-01 RX ADMIN — BACITRACIN ZINC NEOMYCIN SULFATE POLYMYXIN B SULFATE 1 APPLICATION(S): 400; 3.5; 5 OINTMENT TOPICAL at 12:44

## 2023-01-01 RX ADMIN — CEFEPIME 100 MILLIGRAM(S): 1 INJECTION, POWDER, FOR SOLUTION INTRAMUSCULAR; INTRAVENOUS at 17:01

## 2023-01-01 RX ADMIN — PANTOPRAZOLE SODIUM 40 MILLIGRAM(S): 20 TABLET, DELAYED RELEASE ORAL at 05:29

## 2023-01-01 RX ADMIN — HALOPERIDOL DECANOATE 0.5 MILLIGRAM(S): 100 INJECTION INTRAMUSCULAR at 05:06

## 2023-01-01 RX ADMIN — APIXABAN 2.5 MILLIGRAM(S): 2.5 TABLET, FILM COATED ORAL at 17:07

## 2023-01-01 RX ADMIN — PANTOPRAZOLE SODIUM 40 MILLIGRAM(S): 20 TABLET, DELAYED RELEASE ORAL at 05:06

## 2023-01-01 RX ADMIN — BACITRACIN ZINC NEOMYCIN SULFATE POLYMYXIN B SULFATE 1 APPLICATION(S): 400; 3.5; 5 OINTMENT TOPICAL at 12:23

## 2023-01-01 RX ADMIN — Medication 250 MILLIGRAM(S): at 03:56

## 2023-01-01 RX ADMIN — PANTOPRAZOLE SODIUM 40 MILLIGRAM(S): 20 TABLET, DELAYED RELEASE ORAL at 05:40

## 2023-01-01 RX ADMIN — BACITRACIN ZINC NEOMYCIN SULFATE POLYMYXIN B SULFATE 1 APPLICATION(S): 400; 3.5; 5 OINTMENT TOPICAL at 11:46

## 2023-01-01 RX ADMIN — BACITRACIN ZINC NEOMYCIN SULFATE POLYMYXIN B SULFATE 1 APPLICATION(S): 400; 3.5; 5 OINTMENT TOPICAL at 13:03

## 2023-01-01 RX ADMIN — Medication 1: at 21:35

## 2023-01-01 RX ADMIN — PANTOPRAZOLE SODIUM 40 MILLIGRAM(S): 20 TABLET, DELAYED RELEASE ORAL at 06:04

## 2023-01-01 RX ADMIN — Medication 12.5 MILLIGRAM(S): at 06:30

## 2023-01-01 RX ADMIN — CEFEPIME 100 MILLIGRAM(S): 1 INJECTION, POWDER, FOR SOLUTION INTRAMUSCULAR; INTRAVENOUS at 06:50

## 2023-01-01 RX ADMIN — Medication 12.5 MILLIGRAM(S): at 17:01

## 2023-01-01 RX ADMIN — SODIUM CHLORIDE 75 MILLILITER(S): 9 INJECTION INTRAMUSCULAR; INTRAVENOUS; SUBCUTANEOUS at 21:18

## 2023-01-01 RX ADMIN — CHLORHEXIDINE GLUCONATE 1 APPLICATION(S): 213 SOLUTION TOPICAL at 06:29

## 2023-01-01 RX ADMIN — HALOPERIDOL DECANOATE 0.5 MILLIGRAM(S): 100 INJECTION INTRAMUSCULAR at 05:30

## 2023-01-01 RX ADMIN — Medication 12.5 MILLIGRAM(S): at 18:26

## 2023-01-01 RX ADMIN — CEFEPIME 100 MILLIGRAM(S): 1 INJECTION, POWDER, FOR SOLUTION INTRAMUSCULAR; INTRAVENOUS at 17:23

## 2023-01-01 RX ADMIN — Medication 12.5 MILLIGRAM(S): at 05:40

## 2023-01-01 RX ADMIN — Medication 5.95 MICROGRAM(S)/KG/MIN: at 04:49

## 2023-01-01 RX ADMIN — APIXABAN 2.5 MILLIGRAM(S): 2.5 TABLET, FILM COATED ORAL at 06:04

## 2023-01-01 RX ADMIN — HALOPERIDOL DECANOATE 0.5 MILLIGRAM(S): 100 INJECTION INTRAMUSCULAR at 16:56

## 2023-01-01 RX ADMIN — CHLORHEXIDINE GLUCONATE 1 APPLICATION(S): 213 SOLUTION TOPICAL at 06:03

## 2023-01-01 RX ADMIN — PANTOPRAZOLE SODIUM 40 MILLIGRAM(S): 20 TABLET, DELAYED RELEASE ORAL at 06:31

## 2023-01-01 RX ADMIN — CEFEPIME 100 MILLIGRAM(S): 1 INJECTION, POWDER, FOR SOLUTION INTRAMUSCULAR; INTRAVENOUS at 06:30

## 2023-01-01 RX ADMIN — BACITRACIN ZINC NEOMYCIN SULFATE POLYMYXIN B SULFATE 1 APPLICATION(S): 400; 3.5; 5 OINTMENT TOPICAL at 17:02

## 2023-01-01 RX ADMIN — PANTOPRAZOLE SODIUM 40 MILLIGRAM(S): 20 TABLET, DELAYED RELEASE ORAL at 06:57

## 2023-01-01 RX ADMIN — Medication 12.5 MILLIGRAM(S): at 05:25

## 2023-01-01 RX ADMIN — APIXABAN 2.5 MILLIGRAM(S): 2.5 TABLET, FILM COATED ORAL at 18:36

## 2023-01-01 RX ADMIN — APIXABAN 2.5 MILLIGRAM(S): 2.5 TABLET, FILM COATED ORAL at 06:29

## 2023-02-13 NOTE — ED PROVIDER NOTE - ATTENDING CONTRIBUTION TO CARE
I have personally performed a history and physical exam on this patient and personally directed the management of the patient. Patient is an 88-year-old male past medical history of bladder cancer throat cancer and prior history of stroke coronary artery disease coronary bypass grafting atrial fibrillation on Eliquis with defibrillator placement presents for evaluation of worsening shortness of breath as well as increasing confusion between 3 and 6 hours prior to arrival patient was prescribed ciprofloxacin as an outpatient for presumed UTI patient denies any fevers chills vomiting or diarrhea denies any abdominal pain or back pain patient's wife is at bedside provides history    On physical exam patient is normocephalic atraumatic pupils equal round reactive light accommodation extraocular muscles intact oropharynx clear chest clear to auscultation bilaterally patient has irregularly irregular rhythm S1-S2 noted radial pulses 2+ pedal pulses 2+ abdomen soft nontender nondistended bowel sounds positive extremities no edema noted    Assessment plan patient with significant past medical history presents for evaluation of worsening shortness of breath per my independent evaluation EKG not consistent with STEMI in addition per my independent evaluation of x-ray evidence of potential infection we obtain labs white count of 12.64 patient found to have elevated troponin elevated BNP elevated lactate we have initiated IV antibiotics however given patient's cardiac history not a candidate for 30 cc/kg fluid despite the fact that the patient may have infection here during his course in the emergency department patient became hypotensive we consulted ICU who evaluated patient in the emergency department advised to administer 1 L of normal saline I will admit for further monitoring at this time

## 2023-02-13 NOTE — ED PROVIDER NOTE - CLINICAL SUMMARY MEDICAL DECISION MAKING FREE TEXT BOX
patient with significant past medical history presents for evaluation of worsening shortness of breath per my independent evaluation EKG not consistent with STEMI in addition per my independent evaluation of x-ray evidence of potential infection we obtain labs white count of 12.64 patient found to have elevated troponin elevated BNP elevated lactate we have initiated IV antibiotics however given patient's cardiac history not a candidate for 30 cc/kg fluid despite the fact that the patient may have infection here during his course in the emergency department patient became hypotensive we consulted ICU who evaluated patient in the emergency department advised to administer 1 L of normal saline I will admit for further monitoring at this time

## 2023-02-13 NOTE — ED PROVIDER NOTE - NSICDXPASTMEDICALHX_GEN_ALL_CORE_FT
PAST MEDICAL HISTORY:  Afib     Bladder cancer     CAD (coronary artery disease)     CVA (cerebral vascular accident) 2015

## 2023-02-13 NOTE — ED PROVIDER NOTE - PROGRESS NOTE DETAILS
EKG from Jan 09, 2020 reviewed and compared to today's EKG - similar findings noted at that time, with ST inversions in precordial and lateral leads. Pt BP dropped. D/w ICU Dr. Serna - give 1 Liter of Fluid IV and re-assess BP Pt BP still low, advised by ICU Dr. Serna to start on peripheral Levophed and admit to ICU. D/w Bed management aware.

## 2023-02-13 NOTE — ED PROVIDER NOTE - NS ED ROS FT
Constitutional: (-) fever, (-) chills  Eyes: (-) visual changes  ENT: (-) nasal congestion  Cardiovascular: (-) chest pain, (-) syncope  Respiratory: (-) cough, (-) shortness of breath, (-) dyspnea,   Gastrointestinal: (-) vomiting, (-) diarrhea, (-)nausea,  Musculoskeletal: (-) neck pain, (-) back pain, (-) joint pain,  Integumentary: (-) rash, (-) edema, (-) bruises  Neurological: (-) headache, (-) loc, (-) dizziness, (-) tingling, (-)numbness,  Psychiatric: (-) hallucinations, (-)nervousness, (-)depression, (-)SI/HI  Peripheral Vascular: (-) leg swelling  :  (-)dysuria,  (-) hematuria  Allergic/Immunologic: (-) pruritus

## 2023-02-13 NOTE — ED PROVIDER NOTE - OBJECTIVE STATEMENT
88-year-old male with past medical history of bladder cancer, throat cancer, CVA in 2015, CAD, CABG, a-fib on eliquis and with defibrillator presents with complaint of shortness of breath.  Patient's wife at bedside states shortness of breath began about 3 hours prior to arrival.  States patient has been altered since 6 PM.  Patient has chronic UTIs, has been on ciprofloxacin for the past 3 days (prescribed by nephrologist Dr. Lynn) for recent UTI. Denies fever/chills, chest pain, pleuritic chest pain, abdominal pain, n/v/d. 88-year-old male with past medical history of bladder cancer, throat cancer, CVA in 2015, CAD, CABG, a-fib on eliquis and with defibrillator presents with complaint of shortness of breath.  Patient's wife at bedside states shortness of breath began about 3 hours prior to arrival.  States patient has been altered since 6 PM.  Patient has chronic UTIs, has been on ciprofloxacin for the past 3 days (prescribed by nephrologist Dr. Contreras) for recent UTI. Denies fever/chills, chest pain, pleuritic chest pain, abdominal pain, n/v/d.

## 2023-02-13 NOTE — ED PROVIDER NOTE - CARE PLAN
Principal Discharge DX:	Pneumonia  Secondary Diagnosis:	Afib  Secondary Diagnosis:	Bladder cancer  Secondary Diagnosis:	Throat cancer  Secondary Diagnosis:	CAD (coronary artery disease)   1

## 2023-02-13 NOTE — ED PROVIDER NOTE - PHYSICAL EXAMINATION
Physical Exam    Vital Signs: I have reviewed the initial vital signs.  Constitutional: appears stated age, appears uncomfortable on stretcher  Eyes: Conjunctiva pink, Sclera clear, PERRLA, EOMI.  Cardiovascular: S1 and S2, regular rate, regular rhythm, well-perfused extremities, radial pulses equal and 2+, pedal pulses 2+ and equal  Respiratory: unlabored respiratory effort, mildly decreased breath sounds bilaterally no wheezing, rales and rhonchi  Gastrointestinal: soft, non-tender abdomen, urostomy tube present  Musculoskeletal: supple neck, no lower extremity edema, no midline tenderness  Integumentary: warm, dry, no rash  Neurologic: awake, alert, oriented x person and place, extremities’ motor and sensory functions grossly intact

## 2023-02-13 NOTE — ED ADULT TRIAGE NOTE - NS ED NURSE DIRECT TO ROOM YN
Called lvm   
Caller: John Varma    Relationship to patient: Self    Best call back number: [phone]    Chief complaint: WANTS TB BLOOD TEST    Type of visit: OFFICE VISIT    Requested date: AS SOON AS POSSIBLE THIS WEEK      If rescheduling, when is the original appointment: N/A     Additional notes:PATIENT IS NEEDING A TB BLOOD TEST FOR HIS NURSING CLASS. PATIENT IS OPEN TO SEEING ANOTHER PROVIDER TO GET THIS TEST DONE. PLEASE CALL PATIENT TO SCHEDULE IF POSSIBLE.            
Lab order placed  
Lm for pt order placed  
Yes

## 2023-02-14 NOTE — ED ADULT NURSE NOTE - NSIMPLEMENTINTERV_GEN_ALL_ED
Implemented All Fall with Harm Risk Interventions:  [place] to call system. Call bell, personal items and telephone within reach. Instruct patient to call for assistance. Room bathroom lighting operational. Non-slip footwear when patient is off stretcher. Physically safe environment: no spills, clutter or unnecessary equipment. Stretcher in lowest position, wheels locked, appropriate side rails in place. Provide visual cue, wrist band, yellow gown, etc. Monitor gait and stability. Monitor for mental status changes and reorient to person, place, and time. Review medications for side effects contributing to fall risk. Reinforce activity limits and safety measures with patient and family. Provide visual clues: red socks.
PAIN SCALE 7 OF 10.

## 2023-02-14 NOTE — PHYSICAL THERAPY INITIAL EVALUATION ADULT - GENERAL OBSERVATIONS, REHAB EVAL
18:10-18:35. Chart reviewed; confirmed with RN to see the pt for PT. Pt ready for PT; received in bed with no complain of pain and in NAD. +hep lock L UE, +IV R UE, +tele, +pulse ox, +bp cuff, +O2 via NC, +nephrostomy bag. Agreeable for PT evaluation.

## 2023-02-14 NOTE — PATIENT PROFILE ADULT - FALL HARM RISK - RISK INTERVENTIONS
Assistance with ambulation/Communicate Fall Risk and Risk Factors to all staff, patient, and family/Discuss with provider need for PT consult/Monitor for mental status changes/Monitor gait and stability/Reorient to person, place and time as needed/Sit up slowly, dangle for a short time, stand at bedside before walking/Bed in lowest position, wheels locked, appropriate side rails in place/Call bell, personal items and telephone in reach/Instruct patient to call for assistance before getting out of bed or chair/Non-slip footwear when patient is out of bed/[place] to call system/Physically safe environment - no spills, clutter or unnecessary equipment/Purposeful Proactive Rounding/Room/bathroom lighting operational, light cord in reach

## 2023-02-14 NOTE — SWALLOW BEDSIDE ASSESSMENT ADULT - NS SPL SWALLOW CLINIC TRIAL FT
Pt at moderate aspiration PNA risk given PMH throat cx/radiation, decline in mobility per wife, immunosuppresion, and current dx of multifocal PNA. Therefore, pt will benefit from instrumental swallow assessment to r/o aspiration and objectively assess oropharyngeal swallow function. Pending MBSS tomorrow. Pt and wife in agreement with POC.

## 2023-02-14 NOTE — PHYSICAL THERAPY INITIAL EVALUATION ADULT - LEVEL OF INDEPENDENCE: SUPINE/SIT, REHAB EVAL
Pt received from JUAN Porter, PT Aox3 breathing even unlabored spontaneously NAD. MD At bedside. Awaits MD dispo
moderate assist (50% patients effort)

## 2023-02-14 NOTE — H&P ADULT - NSHPPHYSICALEXAM_GEN_ALL_CORE
GENERAL:  89y/o poor nourished W  Male NAD, resting comfortably.  HEAD:  Atraumatic, Normocephalic  EYES: EOMI, PERRLA, conjunctiva and sclera clear  NECK: Supple, No JVD, no cervical lymphadenopathy, non-tender  CHEST/LUNG: Course breath sounds to auscultation bilaterally; No wheeze,   HEART: Regular rate and rhythm; S1&S2  ABDOMEN: Soft, Nontender, Nondistended x 4 quadrants; Bowel sounds present  EXTREMITIES:   Peripheral Pulses Present, No clubbing, no cyanosis, or no edema, no calf tenderness  PSYCH: AAOx3, cooperative, appropriate  NEUROLOGY: WNL  SKIN: WNL

## 2023-02-14 NOTE — H&P ADULT - HISTORY OF PRESENT ILLNESS
· Chief Complaint: The patient is a 88y Male complaining of shortness of breath.  · HPI Objective Statement: 88-year-old male with past medical history of bladder cancer, throat cancer, CVA in 2015, CAD, CABG, a-fib on eliquis and with defibrillator presents with complaint of shortness of breath.  Patient's wife at bedside states shortness of breath began about 3 hours prior to arrival.  States patient has been altered since 6 PM.  Patient has chronic UTIs, has been on ciprofloxacin for the past 3 days (prescribed by nephrologist Dr. Contreras) for recent UTI. Denies fever/chills, chest pain, pleuritic chest pain, abdominal pain, n/v/d.

## 2023-02-14 NOTE — CONSULT NOTE ADULT - ASSESSMENT
IMPRESSION:    Acute hypoxic respiratory failure  CHF  Afib on eliquis  Shock likely cardiac vs septic  Possible aspiration  HO CVA    PLAN:    CNS:     HEENT: Oral care    PULMONARY:  HOB @ 45 degrees    CARDIOVASCULAR: Echo is still pending    GI: GI prophylaxis.  Feeding per S&S, Barium swallow per speech.    RENAL:  Follow up lytes.  Correct as needed    INFECTIOUS DISEASE: Follow up cultures. On vanc and cefepime. ID evaluation appreciated, modify dose based on GFR.    HEMATOLOGICAL:  DVT prophylaxis. CW Eliquis    ENDOCRINE:  Follow up FS.  Insulin protocol if needed.    MUSCULOSKELETAL: Try bed in chair position, PT/OT eval.

## 2023-02-14 NOTE — PROGRESS NOTE ADULT - SUBJECTIVE AND OBJECTIVE BOX
Patient is a 88y old  Male who presents with a chief complaint of sepsis (2023 08:25)      HPI:    · Chief Complaint: The patient is a 88y Male complaining of shortness of breath.  · HPI Objective Statement: 88-year-old male with past medical history of bladder cancer, throat cancer, CVA in 2015, CAD, CABG, a-fib on eliquis and with defibrillator presents with complaint of shortness of breath.  Patient's wife at bedside states shortness of breath began about 3 hours prior to arrival.  States patient has been altered since 6 PM.  Patient has chronic UTIs, has been on ciprofloxacin for the past 3 days (prescribed by nephrologist Dr. Contreras) for recent UTI. Denies fever/chills, chest pain, pleuritic chest pain, abdominal pain, n/v/d.   (2023 04:06)      PAST MEDICAL & SURGICAL HISTORY:  CAD (coronary artery disease)      Bladder cancer      CVA (cerebral vascular accident)        Afib      Presence of urostomy        S/P CABG x 4  1991      Iliac artery aneurysm        S/P ICD (internal cardiac defibrillator) procedure        Allergies    No Known Allergies    Intolerances      FAMILY HISTORY:  Family history of early CAD      Home Medications:  atorvastatin 10 mg oral tablet: 1 tab(s) orally once a day (2023 03:44)  furosemide 20 mg oral tablet: 1 tab(s) orally once a day (in the morning) (2023 03:44)  glimepiride 2 mg oral tablet: 1 tab(s) orally once a day (2023 03:44)  metoprolol succinate 25 mg oral tablet, extended release: 0.5 tab(s) orally 3 times a day (2023 03:44)    Occupation:  Alochol: Denied  Smoking: Denied  Drug Use: Denied  Marital Status:         ROS: as in HPI; All other systems reviewed are negative    ICU Vital Signs Last 24 Hrs  T(C): 36 (2023 07:10), Max: 38.7 (2023 21:57)  T(F): 96.8 (2023 07:10), Max: 101.7 (2023 21:57)  HR: 60 (2023 08:27) (53 - 92)  BP: 104/84 (2023 08:27) (70/36 - 126/56)  BP(mean): 90 (2023 08:27) (46 - 90)  ABP: --  ABP(mean): --  RR: 24 (2023 09:09) (16 - 29)  SpO2: 94% (2023 08:27) (91% - 99%)    O2 Parameters below as of 2023 08:27  Patient On (Oxygen Delivery Method): nasal cannula  O2 Flow (L/min): 3            Physical Examination:    General: No acute distress.  Alert, oriented, interactive, nonfocal    HEENT: Pupils equal, reactive to light.  Symmetric.    PULM: Clear to auscultation bilaterally, no significant sputum production    CVS: Regular rate and rhythm, no murmurs, rubs, or gallops    ABD: Soft, nondistended, nontender, normoactive bowel sounds, no masses    EXT: No edema, nontender    SKIN: Warm and well perfused, no rashes noted.              I&O's Detail    2023 07:01  -  2023 07:00  --------------------------------------------------------  IN:    Norepinephrine: 16 mL    sodium chloride 0.9%: 300 mL  Total IN: 316 mL    OUT:  Total OUT: 0 mL    Total NET: 316 mL      2023 07:01  -  2023 09:51  --------------------------------------------------------  IN:    Norepinephrine: 9 mL    Oral Fluid: 200 mL    sodium chloride 0.9%: 225 mL  Total IN: 434 mL    OUT:  Total OUT: 0 mL    Total NET: 434 mL            LABS:                        11.5   12.64 )-----------( 183      ( 2023 22:23 )             39.3     2023 22:23    139    |  105    |  39     ----------------------------<  122    4.8     |  25     |  1.3      Ca    9.9        2023 22:23    TPro  7.0    /  Alb  3.4    /  TBili  0.8    /  DBili  x      /  AST  30     /  ALT  23     /  AlkPhos  106    2023 22:23  Amylase x     lipase x          CARDIAC MARKERS ( 2023 22:23 )  x     / 0.03 ng/mL / x     / x     / x          CAPILLARY BLOOD GLUCOSE        PT/INR - ( 2023 22:23 )   PT: 17.80 sec;   INR: 1.54 ratio         PTT - ( 2023 22:23 )  PTT:40.4 sec  Urinalysis Basic - ( 2023 00:00 )    Color: Yellow / Appearance: Cloudy / S.020 / pH: x  Gluc: x / Ketone: Negative  / Bili: Negative / Urobili: 1.0 mg/dL   Blood: x / Protein: 30 mg/dL / Nitrite: Negative   Leuk Esterase: Large / RBC: 3-5 /HPF / WBC >50 /HPF   Sq Epi: x / Non Sq Epi: Few /HPF / Bacteria: TNTC /HPF      Culture        MEDICATIONS  (STANDING):  apixaban 2.5 milliGRAM(s) Oral every 12 hours  cefepime   IVPB      cefepime   IVPB 1000 milliGRAM(s) IV Intermittent every 8 hours  chlorhexidine 2% Cloths 1 Application(s) Topical <User Schedule>  influenza  Vaccine (HIGH DOSE) 0.7 milliLiter(s) IntraMuscular once  metoprolol succinate ER 12.5 milliGRAM(s) Oral two times a day  norepinephrine Infusion 0.05 MICROgram(s)/kG/Min (5.95 mL/Hr) IV Continuous <Continuous>  pantoprazole    Tablet 40 milliGRAM(s) Oral before breakfast  sodium chloride 0.9%. 1000 milliLiter(s) (75 mL/Hr) IV Continuous <Continuous>  vancomycin  IVPB        MEDICATIONS  (PRN):  acetaminophen     Tablet .. 325 milliGRAM(s) Oral every 4 hours PRN Temp greater or equal to 38C (100.4F)        RADIOLOGY: ***     CXR:  TLC:  OG:  ET tube:          ECHO: pending       IMPRESSION:  AHRF   HoTN (sepsis 2/2 PNA, r/o cardiogenic shock?)  HO CVA  HO CAD s/p CABG  HO CHF s/p AICD (f/u w/ Dr. SIMRAN Mast)      PLAN:    CNS: avoid depressants    HEENT: Oral care     PULMONARY: HOB elevated at 45 degrees, supplementary O2 (on 2l now), keep pulse ox >92%, repeat xray tomorrow, f/u dimer, procalc    CARDIOVASCULAR: on levo, try weaning as tolerated. f/u CE, serial EKGs, TTE, f/u cardio, f/u vitals     GI: GI prophylaxis.  Feeding     RENAL: monitor lytes, correct as needed. trend cr, c/w IVF     INFECTIOUS DISEASE: monitor WBC, f/u culture, procalc, c/w vanc and cefepime, f/u ID     HEMATOLOGICAL:  on Eliquis     ENDOCRINE:  Follow up FS.  Insulin protocol if needed.    MUSCULOSKELETAL: Bedrest         CRITICAL CARE TIME SPENT: ***     Patient is a 88y old  Male who presents with a chief complaint of sepsis (2023 08:25)      HPI:    · Chief Complaint: The patient is a 88y Male complaining of shortness of breath.  · HPI Objective Statement: 88-year-old male with past medical history of bladder cancer, throat cancer, CVA in 2015, CAD, CABG, a-fib on eliquis and with defibrillator presents with complaint of shortness of breath.  Patient's wife at bedside states shortness of breath began about 3 hours prior to arrival.  States patient has been altered since 6 PM.  Patient has chronic UTIs, has been on ciprofloxacin for the past 3 days (prescribed by nephrologist Dr. Contreras) for recent UTI. Denies fever/chills, chest pain, pleuritic chest pain, abdominal pain, n/v/d.   (2023 04:06)      PAST MEDICAL & SURGICAL HISTORY:  CAD (coronary artery disease)      Bladder cancer      CVA (cerebral vascular accident)        Afib      Presence of urostomy        S/P CABG x 4  1991      Iliac artery aneurysm        S/P ICD (internal cardiac defibrillator) procedure        Allergies    No Known Allergies    Intolerances      FAMILY HISTORY:  Family history of early CAD      Home Medications:  atorvastatin 10 mg oral tablet: 1 tab(s) orally once a day (2023 03:44)  furosemide 20 mg oral tablet: 1 tab(s) orally once a day (in the morning) (2023 03:44)  glimepiride 2 mg oral tablet: 1 tab(s) orally once a day (2023 03:44)  metoprolol succinate 25 mg oral tablet, extended release: 0.5 tab(s) orally 3 times a day (2023 03:44)    Occupation:  Alochol: Denied  Smoking: Denied  Drug Use: Denied  Marital Status:         ROS: as in HPI; All other systems reviewed are negative    ICU Vital Signs Last 24 Hrs  T(C): 36 (2023 07:10), Max: 38.7 (2023 21:57)  T(F): 96.8 (2023 07:10), Max: 101.7 (2023 21:57)  HR: 60 (2023 08:27) (53 - 92)  BP: 104/84 (2023 08:27) (70/36 - 126/56)  BP(mean): 90 (2023 08:27) (46 - 90)  ABP: --  ABP(mean): --  RR: 24 (2023 09:09) (16 - 29)  SpO2: 94% (2023 08:27) (91% - 99%)    O2 Parameters below as of 2023 08:27  Patient On (Oxygen Delivery Method): nasal cannula  O2 Flow (L/min): 3            Physical Examination:    General: No acute distress.  Alert, oriented, interactive, nonfocal    HEENT: Pupils equal, reactive to light.  Symmetric.    PULM: b/l  Crackles, no significant sputum production    CVS: Regular rate and rhythm, no murmurs, rubs, or gallops    ABD: Soft, nondistended, nontender, normoactive bowel sounds, no masses    EXT: No edema, nontender    NEURO: L UE and LLE weakness s/p stroke     SKIN: Warm and well perfused, no rashes noted, L LE ulcer w/ pus              I&O's Detail    2023 07:01  -  2023 07:00  --------------------------------------------------------  IN:    Norepinephrine: 16 mL    sodium chloride 0.9%: 300 mL  Total IN: 316 mL    OUT:  Total OUT: 0 mL    Total NET: 316 mL      2023 07:01  -  2023 09:51  --------------------------------------------------------  IN:    Norepinephrine: 9 mL    Oral Fluid: 200 mL    sodium chloride 0.9%: 225 mL  Total IN: 434 mL    OUT:  Total OUT: 0 mL    Total NET: 434 mL            LABS:                        11.5   12.64 )-----------( 183      ( 2023 22:23 )             39.3     2023 22:23    139    |  105    |  39     ----------------------------<  122    4.8     |  25     |  1.3      Ca    9.9        2023 22:23    TPro  7.0    /  Alb  3.4    /  TBili  0.8    /  DBili  x      /  AST  30     /  ALT  23     /  AlkPhos  106    2023 22:23  Amylase x     lipase x          CARDIAC MARKERS ( 2023 22:23 )  x     / 0.03 ng/mL / x     / x     / x          CAPILLARY BLOOD GLUCOSE        PT/INR - ( 2023 22:23 )   PT: 17.80 sec;   INR: 1.54 ratio         PTT - ( 2023 22: )  PTT:40.4 sec  Urinalysis Basic - ( 2023 00:00 )    Color: Yellow / Appearance: Cloudy / S.020 / pH: x  Gluc: x / Ketone: Negative  / Bili: Negative / Urobili: 1.0 mg/dL   Blood: x / Protein: 30 mg/dL / Nitrite: Negative   Leuk Esterase: Large / RBC: 3-5 /HPF / WBC >50 /HPF   Sq Epi: x / Non Sq Epi: Few /HPF / Bacteria: TNTC /HPF      Culture        MEDICATIONS  (STANDING):  apixaban 2.5 milliGRAM(s) Oral every 12 hours  cefepime   IVPB      cefepime   IVPB 1000 milliGRAM(s) IV Intermittent every 8 hours  chlorhexidine 2% Cloths 1 Application(s) Topical <User Schedule>  influenza  Vaccine (HIGH DOSE) 0.7 milliLiter(s) IntraMuscular once  metoprolol succinate ER 12.5 milliGRAM(s) Oral two times a day  norepinephrine Infusion 0.05 MICROgram(s)/kG/Min (5.95 mL/Hr) IV Continuous <Continuous>  pantoprazole    Tablet 40 milliGRAM(s) Oral before breakfast  sodium chloride 0.9%. 1000 milliLiter(s) (75 mL/Hr) IV Continuous <Continuous>  vancomycin  IVPB        MEDICATIONS  (PRN):  acetaminophen     Tablet .. 325 milliGRAM(s) Oral every 4 hours PRN Temp greater or equal to 38C (100.4F)        RADIOLOGY: ***     CXR:  TLC:  OG:  ET tube:          ECHO: pending       IMPRESSION:  AHRF   HoTN (sepsis 2/2 PNA, r/o cardiogenic shock?)  HO CVA  HO CAD s/p CABG  HO CHF s/p AICD (f/u w/ Dr. SIMRAN Mast)      PLAN:    CNS: avoid depressants    HEENT: Oral care     PULMONARY: HOB elevated at 45 degrees, supplementary O2 (on 2l now), keep pulse ox >92%, repeat xray tomorrow, f/u dimer, procalc    CARDIOVASCULAR: on levo, try weaning as tolerated. f/u CE, serial EKGs, TTE, f/u cardio, f/u vitals     GI: GI prophylaxis.  Feeding     RENAL: monitor lytes, correct as needed. trend cr, c/w IVF     INFECTIOUS DISEASE: monitor WBC, f/u culture, procalc, c/w vanc and cefepime, f/u ID     HEMATOLOGICAL:  on Eliquis     ENDOCRINE:  Follow up FS.  Insulin protocol if needed.    MUSCULOSKELETAL: Bedrest         CRITICAL CARE TIME SPENT: ***

## 2023-02-14 NOTE — PROGRESS NOTE ADULT - SUBJECTIVE AND OBJECTIVE BOX
Patient seen and evaluated this am, feels weak, denies pain, on Levophed       T(F): 96.8 (02-14-23 @ 07:10), Max: 101.7 (02-13-23 @ 21:57)  HR: 60 (02-14-23 @ 08:27)  BP: 104/84 (02-14-23 @ 08:27)  RR: 20  SpO2: 94% (02-14-23 @ 08:27) (91% - 99%)    PHYSICAL EXAM:  GENERAL: NAD  HEAD:  Atraumatic, Normocephalic  EYES: EOMI, PERRLA, conjunctiva and sclera clear  NERVOUS SYSTEM: no focal deficits   CHEST/LUNG: bilateral crackles at basses   HEART: irregular   ABDOMEN: Soft, Nontender, Nondistended; Bowel sounds present  EXTREMITIES:  2+ Peripheral Pulses, No clubbing, cyanosis, or edema    LABS  02-13    139  |  105  |  39<H>  ----------------------------<  122<H>  4.8   |  25  |  1.3    Ca    9.9      13 Feb 2023 22:23    TPro  7.0  /  Alb  3.4<L>  /  TBili  0.8  /  DBili  x   /  AST  30  /  ALT  23  /  AlkPhos  106  02-13                          11.5   12.64 )-----------( 183      ( 13 Feb 2023 22:23 )             39.3         PT/INR - ( 13 Feb 2023 22:23 )   PT: 17.80 sec;   INR: 1.54 ratio         PTT - ( 13 Feb 2023 22:23 )  PTT:40.4 sec    CARDIAC ENZYMES    Troponin T, Serum: 0.03 ng/mL (02-13-23 @ 22:23)    < from: 12 Lead ECG (02.14.23 @ 08:41) >  Diagnosis Line Atrial fibrillation  Left axis deviation  Non-specific intra-ventricular conduction delay    < end of copied text >    RADIOLOGY    CXR - b/l opacities    MEDICATIONS  (STANDING):  apixaban 2.5 milliGRAM(s) Oral every 12 hours    cefepime   IVPB 1000 milliGRAM(s) IV Intermittent every 8 hours  chlorhexidine 2% Cloths 1 Application(s) Topical <User Schedule>  metoprolol succinate ER 12.5 milliGRAM(s) Oral two times a day  norepinephrine Infusion 0.05 MICROgram(s)/kG/Min (5.95 mL/Hr) IV Continuous <Continuous>  pantoprazole    Tablet 40 milliGRAM(s) Oral before breakfast  sodium chloride 0.9%. 1000 milliLiter(s) (75 mL/Hr) IV Continuous <Continuous>  vancomycin  IVPB        MEDICATIONS  (PRN):  acetaminophen     Tablet .. 325 milliGRAM(s) Oral every 4 hours PRN Temp greater or equal to 38C (100.4F)

## 2023-02-14 NOTE — H&P ADULT - NSICDXPASTSURGICALHX_GEN_ALL_CORE_FT
PAST SURGICAL HISTORY:  Iliac artery aneurysm 2002    Presence of urostomy 2003    S/P CABG x 4 1991    S/P ICD (internal cardiac defibrillator) procedure

## 2023-02-14 NOTE — H&P ADULT - CRITICAL CARE ATTENDING COMMENT
Pt seen by PA and myself.  Pt w weakness, frail appearing. Hx of remote bladder ca has urostomy bag in place.  Pt admitted for weakness and possible pneumonia, became hypotensive requiring pressor support, pt was started on levophed.  Continue abx.  Continue IVF hydration. Admit to ICU.

## 2023-02-14 NOTE — H&P ADULT - ASSESSMENT
1.  pneumonia w/ hypotension requiring pressors r/o sepsis  2. Hx- UTI, CVA, afib, CAD s/p CABGF, CHF s/p ICD,     Plan  Adm to ICU Crit status  gentle IVF hydration for now  IV cefepime & vanco  started IV levophed  cardio & ID consult  cont -eliquis  echo, ecg in am

## 2023-02-14 NOTE — CONSULT NOTE ADULT - ASSESSMENT
CKD stage 3   -Cr stable, pt known to me  PNA / ARF  aspiration / dysphagia  UTI  bladder ca s/p urostomy  head and neck Ca s/p XRT  wt loss  CAD / CABG / CHF / afib / aicd  CVA  DM2  PVD    plan:    renal dose cefepime per ID  vanco per ID, avoid nephrotoxicity  taper off pressors  can dc IVF  f/u cultures  urostomy care  dysphagia diet   O2  ICU care  d/w wife at bedside

## 2023-02-14 NOTE — H&P ADULT - NSHPLABSRESULTS_GEN_ALL_CORE
11.5   12.64 )-----------( 183      ( 2023 22:23 )             39.3       02-13    139  |  105  |  39<H>  ----------------------------<  122<H>  4.8   |  25  |  1.3    Ca    9.9      2023 22:23    TPro  7.0  /  Alb  3.4<L>  /  TBili  0.8  /  DBili  x   /  AST  30  /  ALT  23  /  AlkPhos  106  02-              Urinalysis Basic - ( 2023 00:00 )    Color: Yellow / Appearance: Cloudy / S.020 / pH: x  Gluc: x / Ketone: Negative  / Bili: Negative / Urobili: 1.0 mg/dL   Blood: x / Protein: 30 mg/dL / Nitrite: Negative   Leuk Esterase: Large / RBC: 3-5 /HPF / WBC >50 /HPF   Sq Epi: x / Non Sq Epi: Few /HPF / Bacteria: TNTC /HPF        PT/INR - ( 2023 22:23 )   PT: 17.80 sec;   INR: 1.54 ratio         PTT - ( 2023 22:23 )  PTT:40.4 sec    Lactate Trend      CARDIAC MARKERS ( 2023 22:23 )  x     / 0.03 ng/mL / x     / x     / x            CAPILLARY BLOOD GLUCOSE

## 2023-02-14 NOTE — PROGRESS NOTE ADULT - ASSESSMENT
88-year-old male with past medical history of bladder cancer, throat cancer, CVA in 2015, CAD, CABG, a-fib on eliquis and with defibrillator presents with complaint of shortness of breath.  Patient's wife at bedside states shortness of breath began about 3 hours prior to arrival.  States patient has been altered since 6 PM.  Patient has chronic UTIs, has been on ciprofloxacin for the past 3 days (prescribed by nephrologist Dr. Contreras) for recent UTI. Denies fever/chills, chest pain, pleuritic chest pain, abdominal pain, n/v/d.    sepsis (unclear etiology urine vs lungs)      - continue IV antibiotics as per ID   - follow cultures and procal   - titrate Levo and maintain MAP>65   - would hold metoprolol until off Levo   - continue Eliquis

## 2023-02-14 NOTE — CONSULT NOTE ADULT - ASSESSMENT
88-year-old male with past medical history of bladder cancer, throat cancer, CVA in 2015, CAD, CABG, a-fib on eliquis and with defibrillator presents with complaint of shortness of breath. He has possible pneumonia SEPSIS. On pressors.. Trop mildly increased . Possible MI. Beta asa statin.. Medical rx for now  Culture antibiotics. Prognosis guarded  88-year-old male with past medical history of bladder cancer, throat cancer, CVA in 2015, CAD, CABG, a-fib on eliquis and with defibrillator presents with complaint of shortness of breath. He has possible pneumonia Sepsis. On pressors.. Trop mildly increased .  Medical rx for now  Culture antibiotics. Recheck enzymes labs.

## 2023-02-14 NOTE — CONSULT NOTE ADULT - ATTENDING COMMENTS
Attending Statement: I have personally performed a face to face diagnostic evaluation on this patient. The patient is suffering from:  Acute hypoxic respiratory failure  CHF  Afib on eliquis  Shock likely cardiac vs septic  Possible aspiration  I have made amendments to the documentation where necessary. I have personally seen and examined this patient.  I have fully participated in the care of this patient.  I have reviewed all pertinent clinical information, including history, physical exam, plan and note.

## 2023-02-14 NOTE — CONSULT NOTE ADULT - ASSESSMENT
ASSESSMENT  88-year-old male with past medical history of bladder cancer, throat cancer, CVA in 2015, CAD, CABG, a-fib on eliquis and with defibrillator presents with complaint of shortness of breath.      IMPRESSION  #Septic Shock   #Multifocal Pneumonia   #Recent UTI on cipro  #Throat cancer s/p radiation  #Hx of Bladder CAncaer  #CAD  #CABG    #Obesity BMI (kg/m2): 20.1  #DM   #Abx allergy:       RECOMMENDATIONS  - continue cefepime -- decrease to 1g q 12 hours for CrCl 35   - continue vancomycin 1g q 24 hours -- trend creatinine cloesly   - check MRSA nares -- if negative, stop vancomycin   - sputum cx if able   - follow-up blood cx  - wean pressors as tolerated    Please call or message on [employer] Teams if with any questions.  Spectra [phone]

## 2023-02-14 NOTE — PHYSICAL THERAPY INITIAL EVALUATION ADULT - PERTINENT HX OF CURRENT PROBLEM, REHAB EVAL
88-year-old male with past medical history of bladder cancer, throat cancer, CVA in 2015, CAD, CABG, a-fib on eliquis and with defibrillator presents with complaint of shortness of breath.  Patient's wife at bedside states shortness of breath began about 3 hours prior to arrival.  States patient has been altered since 6 PM.  Patient has chronic UTIs, has been on ciprofloxacin for the past 3 days (prescribed by nephrologist Dr. Contreras) for recent UTI. Denies fever/chills, chest pain, pleuritic chest pain, abdominal pain, n/v/d.

## 2023-02-15 NOTE — SWALLOW VFSS/MBS ASSESSMENT ADULT - ROSENBEK'S PENETRATION ASPIRATION SCALE
for all consistencies/(8) contrast passes glottis, visible subglottic residue remains, absent patient response (aspiration)

## 2023-02-15 NOTE — SWALLOW VFSS/MBS ASSESSMENT ADULT - ADDITIONAL RECOMMENDATIONS
Palliative consult. Ongoing GOC as family has expressed want for pt to keep eating despite risks; comfort measure

## 2023-02-15 NOTE — PROGRESS NOTE ADULT - ASSESSMENT
88-year-old male with past medical history of bladder cancer, throat cancer, CVA in 2015, CAD, CABG, a-fib on eliquis and with defibrillator presents with complaint of shortness of breath.      IMPRESSION  #Septic Shock   #Multifocal Pneumonia   #Recent UTI on cipro  #Throat cancer s/p radiation  #Hx of Bladder CAncaer  #Obesity BMI (kg/m2): 20.1  #Abx allergy:       RECOMMENDATIONS  - continue cefepime -- decrease to 1g q 12 hours for CrCl 35   - continue vancomycin 1g q 24 hours -- trend creatinine cloesly   - check MRSA nares -- if negative, stop vancomycin   - sputum cx if able   - follow-up blood cx  - wean pressors as tolerated     88-year-old male with past medical history of bladder cancer, throat cancer, CVA in 2015, CAD, CABG, a-fib on eliquis and with defibrillator presents with complaint of shortness of breath.      IMPRESSION  #Septic Shock   #Multifocal Pneumonia - MRSA PCR is negative   #Recent UTI on cipro - Urine culture grew Klebsiella  #Throat cancer s/p radiation  #Hx of Bladder Cancer   #Obesity BMI (kg/m2): 20.1  #Abx allergy:     Would recommend:    1. Follow up final Urine culture for sensitivities of Klebsiella  2. Continue cefepime until urine culture is finalized   3. Discontinue vancomycin since MRSA PCR is negative   4, Monitor kidney function and adjust Abx doses accordingly    d/w CCU team      Attending Attestation:    Spent more than 45 minutes on total encounter, more than 50 % of the visit was spent counseling and/or coordinating care by the Attending physician.

## 2023-02-15 NOTE — PROGRESS NOTE ADULT - SUBJECTIVE AND OBJECTIVE BOX
Patient is a 88y old  Male who presents with a chief complaint of sepsis (14 Feb 2023 18:12)      T(F): 96.4 (02-15-23 @ 03:00), Max: 97.3 (02-14-23 @ 21:50)  HR: 62 (02-15-23 @ 05:17)  BP: 122/65 (02-15-23 @ 05:17)  RR: 16 (02-15-23 @ 05:17)  SpO2: 94% (02-15-23 @ 05:17) (87% - 100%)    PHYSICAL EXAM:  GENERAL: NAD, well-groomed, well-developed  HEAD:  Atraumatic, Normocephalic  EYES: EOMI, PERRLA, conjunctiva and sclera clear  ENMT: No tonsillar erythema, exudates, or enlargement; Moist mucous membranes, Good dentition, No lesions  NECK: Supple, No JVD, Normal thyroid  NERVOUS SYSTEM:  Alert & Oriented X3,  Motor Strength 5/5 B/L upper and lower extremities  CHEST/LUNG: Clear to percussion bilaterally; No rales, rhonchi, wheezing, or rubs  HEART: Regular rate and rhythm; No murmurs, rubs, or gallops  ABDOMEN: Soft, Nontender, Nondistended; Bowel sounds present  EXTREMITIES:   No clubbing, cyanosis, or edema  LYMPH: No lymphadenopathy noted  SKIN: No rashes or lesions    labs  02-13    139  |  105  |  39<H>  ----------------------------<  122<H>  4.8   |  25  |  1.3    Ca    9.9      13 Feb 2023 22:23    TPro  7.0  /  Alb  3.4<L>  /  TBili  0.8  /  DBili  x   /  AST  30  /  ALT  23  /  AlkPhos  106  02-13                          10.4   6.92  )-----------( 153      ( 15 Feb 2023 07:21 )             36.3       PT/INR - ( 13 Feb 2023 22:23 )   PT: 17.80 sec;   INR: 1.54 ratio         PTT - ( 13 Feb 2023 22:23 )  PTT:40.4 sec        acetaminophen     Tablet .. 325 milliGRAM(s) Oral every 4 hours PRN  apixaban 2.5 milliGRAM(s) Oral every 12 hours  cefepime   IVPB 1000 milliGRAM(s) IV Intermittent every 12 hours  chlorhexidine 2% Cloths 1 Application(s) Topical <User Schedule>  influenza  Vaccine (HIGH DOSE) 0.7 milliLiter(s) IntraMuscular once  metoprolol succinate ER 12.5 milliGRAM(s) Oral two times a day  neomycin/bacitracin/polymyxin Topical Ointment 1 Application(s) Topical daily  norepinephrine Infusion 0.05 MICROgram(s)/kG/Min IV Continuous <Continuous>  pantoprazole    Tablet 40 milliGRAM(s) Oral before breakfast  vancomycin  IVPB      vancomycin  IVPB 1000 milliGRAM(s) IV Intermittent every 24 hours

## 2023-02-15 NOTE — PROGRESS NOTE ADULT - ASSESSMENT
88-year-old male with past medical history of bladder cancer, throat cancer, CVA in 2015, CAD, CABG, a-fib on eliquis and with defibrillator presents with complaint of shortness of breath.  Patient's wife at bedside states shortness of breath began about 3 hours prior to arrival.  States patient has been altered since 6 PM.  Patient has chronic UTIs, has been on ciprofloxacin for the past 3 days (prescribed by nephrologist Dr. Contreras) for recent UTI. Denies fever/chills, chest pain, pleuritic chest pain, abdominal pain, n/v/d.    sepsis (unclear etiology urine vs lungs) /      - continue IV antibiotics as per ID   - gram negative rods in urine   - follow cultures and procal   - titrate Levo and maintain MAP>65   - would hold metoprolol until off Levo   - continue Eliquis

## 2023-02-15 NOTE — PROGRESS NOTE ADULT - SUBJECTIVE AND OBJECTIVE BOX
NEPHROLOGY FOLLOW UP NOTE    pt seen and examined in icu  off O2  off pressors  tolerating PO  urostomy draining  Cr worse  less cough, no sob      PAST MEDICAL & SURGICAL HISTORY:  CAD (coronary artery disease)      Bladder cancer      CVA (cerebral vascular accident)        Afib      Presence of urostomy  2003      S/P CABG x 4  1991      Iliac artery aneurysm        S/P ICD (internal cardiac defibrillator) procedure        Allergies:  No Known Allergies    Home Medications Reviewed    SOCIAL HISTORY:  Denies ETOH,Smoking,   FAMILY HISTORY:  Family history of early CAD          REVIEW OF SYSTEMS:  CONSTITUTIONAL: No weakness, fevers or chills  EYES/ENT: No visual changes;  No vertigo or throat pain   NECK: No pain or stiffness  RESPIRATORY: + cough, wheezing, hemoptysis; No shortness of breath  CARDIOVASCULAR: No chest pain or palpitations.  GASTROINTESTINAL: No abdominal or epigastric pain. No nausea, vomiting, or hematemesis; No diarrhea or constipation. No melena or hematochezia.  GENITOURINARY: No dysuria, frequency, foamy urine, urinary urgency, incontinence or hematuria  NEUROLOGICAL: No numbness or weakness  SKIN: No itching, burning, rashes, or lesions   VASCULAR: No bilateral lower extremity edema.   All other review of systems is negative unless indicated above.    PHYSICAL EXAM:  Constitutional: thin, frail  HEENT: anicteric sclera, oropharynx clear, MMM  Neck: No JVD  Respiratory: CTAB, no wheezes, rales or rhonchi  Cardiovascular: S1, S2, RRR  Gastrointestinal: BS+, soft, NT/ND  Extremities: No cyanosis or clubbing. No peripheral edema  Neurological: A/O x 3, no focal deficits  Psychiatric: Normal mood, normal affect  : No CVA tenderness. + urostomy  Skin: No rashes    Hospital Medications:   MEDICATIONS  (STANDING):  apixaban 2.5 milliGRAM(s) Oral every 12 hours  cefepime   IVPB 1000 milliGRAM(s) IV Intermittent every 12 hours  chlorhexidine 2% Cloths 1 Application(s) Topical <User Schedule>  influenza  Vaccine (HIGH DOSE) 0.7 milliLiter(s) IntraMuscular once  metoprolol succinate ER 12.5 milliGRAM(s) Oral two times a day  neomycin/bacitracin/polymyxin Topical Ointment 1 Application(s) Topical daily  norepinephrine Infusion 0.05 MICROgram(s)/kG/Min (5.95 mL/Hr) IV Continuous <Continuous>  pantoprazole    Tablet 40 milliGRAM(s) Oral before breakfast  vancomycin  IVPB      vancomycin  IVPB 1000 milliGRAM(s) IV Intermittent every 24 hours        VITALS:  T(F): 96.2 (02-15-23 @ 07:01), Max: 97.3 (23 @ 21:50)  HR: 62 (02-15-23 @ 07:24)  BP: 121/59 (02-15-23 @ 07:24)  RR: 21 (02-15-23 @ 07:24)  SpO2: 98% (02-15-23 @ 07:24)  Wt(kg): --     @ 07:01  -   @ 07:00  --------------------------------------------------------  IN: 316 mL / OUT: 0 mL / NET: 316 mL     @ 07:01  -  02-15 @ 07:00  --------------------------------------------------------  IN: 1526 mL / OUT: 350 mL / NET: 1176 mL          LABS:  02-15    138  |  108  |  45<H>  ----------------------------<  85  5.4<H>   |  21  |  1.7<H>    Ca    9.1      15 Feb 2023 07:21  Phos  4.2     02-15  Mg     1.8     02-15    TPro  6.1  /  Alb  2.8<L>  /  TBili  0.4  /  DBili  0.2  /  AST  36  /  ALT  23  /  AlkPhos  90  02-15                          10.4   6.92  )-----------( 153      ( 15 Feb 2023 07:21 )             36.3       Urine Studies:  Urinalysis Basic - ( 2023 00:00 )    Color: Yellow / Appearance: Cloudy / S.020 / pH:   Gluc:  / Ketone: Negative  / Bili: Negative / Urobili: 1.0 mg/dL   Blood:  / Protein: 30 mg/dL / Nitrite: Negative   Leuk Esterase: Large / RBC: 3-5 /HPF / WBC >50 /HPF   Sq Epi:  / Non Sq Epi: Few /HPF / Bacteria: TNTC /HPF          RADIOLOGY & ADDITIONAL STUDIES:

## 2023-02-15 NOTE — SWALLOW VFSS/MBS ASSESSMENT ADULT - DIAGNOSTIC IMPRESSIONS
Patient presents with a severe pharyngeal dysphagia characterized by gross (>25% of bolus) aspiration of all consistencies trialed and severe amount of residue within pharyngeal structures. This is likely a result of throat cx and subsequent radiation; pt never received dysphagia intervention post-radiation and it can be assumed that pharyngeal muscles have atrophied. Pt is at significantly heightened risk of developing aspiration PNA given decline in mobility, reduced cognitive abilities, immunosuppression, and current multifocal PNA.     In depth conversation had with patient, his wife, and his granddaughter re: results of MBSS. SLP explained risks vs. benefits of continuing PO intake (risks: PNA, malnutrition, dehydration, benefits: quality of life) vs. PEG tube (risks: surgery, reduced quality of life, infection, benefits: may reduce risk of developing aspiration PNA). Pt and family verbalized understanding and expressed that they do not want a feeding tube. They are open to discussing palliative care. Therefore, physician to continue GOC discussion.

## 2023-02-15 NOTE — PROGRESS NOTE ADULT - ASSESSMENT
Patient lying flat . No complaints. Recheck troponin. If stable oob to chair. Check cultures. Antibiotics

## 2023-02-15 NOTE — SWALLOW VFSS/MBS ASSESSMENT ADULT - RECOMMENDED CONSISTENCY
Consider (a) PO comfort feeding despite high risk for PNA/malnutrition/dehydration vs. (b) NPO w/ long term enteral feeding route which does not eliminate risk for aspiration PNA and will likely negatively impact quality of life

## 2023-02-15 NOTE — SWALLOW VFSS/MBS ASSESSMENT ADULT - SLP GENERAL OBSERVATIONS
Pt brought down to radiology, AOx3 on room air. Participated in length of exam w/ radiologist present

## 2023-02-15 NOTE — PHARMACOTHERAPY INTERVENTION NOTE - COMMENTS
As per policy, ordered a vancomycin trough level prior to the 3rd overall dose of vancomycin 1g IV q24h for ~01:00 on 2/16, for further dosing optimization.     Evaristo Dutton PharmD  Clinical Pharmacy Specialist, Infectious Diseases  Tele-Antimicrobial Stewardship Program (Tele-[facility])  Tele-ASP Phone: [phone]

## 2023-02-15 NOTE — SWALLOW VFSS/MBS ASSESSMENT ADULT - PHARYNGEAL PHASE COMMENTS
Pt presents with a severe pharyngeal dysphagia across all consistencies trialed. Pt with gross aspiration of all consistencies during and after the swallow. Pt with increased residue in pyriform sinuses as a result of poor PES opening; this residue was then aspirated after the swallow. Pt sensate to aspiration events ~40% of the time; cough not effective in clearing any aspirated materials. Chin tuck, chin tuck w/ head turn, and supraglottic swallow were attempted in order to improve airway protection. However, none of these strategies were effective.

## 2023-02-15 NOTE — PROGRESS NOTE ADULT - SUBJECTIVE AND OBJECTIVE BOX
Patient is a 88y old  Male who presents with a chief complaint of sepsis (15 Feb 2023 17:50)        HPI:    · Chief Complaint: The patient is a 88y Male complaining of shortness of breath.  · HPI Objective Statement: 88-year-old male with past medical history of bladder cancer, throat cancer, CVA in 2015, CAD, CABG, a-fib on eliquis and with defibrillator presents with complaint of shortness of breath.  Patient's wife at bedside states shortness of breath began about 3 hours prior to arrival.  States patient has been altered since 6 PM.  Patient has chronic UTIs, has been on ciprofloxacin for the past 3 days (prescribed by nephrologist Dr. Contreras) for recent UTI. Denies fever/chills, chest pain, pleuritic chest pain, abdominal pain, n/v/d.   (2023 04:06)      Pt evaluated on rounds.  I reviewed the radiology tests and hospital record.    I reviewed previous notes on this patient. Pt seen early today on rounds.    Interval Events: No overnight events.        REVIEW OF SYSTEMS:   see HPI      OBJECTIVE:  ICU Vital Signs Last 24 Hrs  T(C): 35.9 (15 Feb 2023 19:00), Max: 37 (15 Feb 2023 15:00)  T(F): 96.6 (15 Feb 2023 19:00), Max: 98.6 (15 Feb 2023 15:00)  HR: 82 (15 Feb 2023 19:00) (58 - 82)  BP: 151/67 (15 Feb 2023 19:00) (108/58 - 151/67)  BP(mean): 97 (15 Feb 2023 19:00) (79 - 101)  ABP: --  ABP(mean): --  RR: 10 (15 Feb 2023 19:00) (10 - 29)  SpO2: 93% (15 Feb 2023 19:00) (93% - 100%)    O2 Parameters below as of 15 Feb 2023 15:00  Patient On (Oxygen Delivery Method): room air              -14 @ 07:01  -  -15 @ 07:00  --------------------------------------------------------  IN: 1526 mL / OUT: 350 mL / NET: 1176 mL    02-15 @ 07:01  -  02-15 @ 21:08  --------------------------------------------------------  IN: 380 mL / OUT: 500 mL / NET: -120 mL      CAPILLARY BLOOD GLUCOSE            PHYSICAL EXAM:       · ENMT:   Airway patent,   Nasal mucosa clear.  Mouth with normal mucosa.   No thrush    · EYES:   Clear bilaterally,   pupils equal,   round and reactive to light.    · CARDIAC:   Normal rate,   regular rhythm.    Heart sounds S1, S2.   No murmurs, no rubs or gallops on auscultation  no edema        CAROTID:   normal systolic impulse  no bruits    · RESPIRATORY:   rales  normal chest expansion  no retractions or use of accessory muscles  percussion of chest demonstrates no hyperresonance or dullness    · GASTROINTESTINAL:  Abdomen soft,   non-tender,   + BS  liver/spleen not palpable    · MUSCULOSKELETAL:   no clubbing, cyanosis      · SKIN:   Skin normal color for race,   warm, dry   No evidence of rash.        · HEME LYMPH:   no splenomegaly.  No cervical  lymphadenopathy.  no inguinal lymphadenopathy    HOSPITAL MEDICATIONS:  MEDICATIONS  (STANDING):  apixaban 2.5 milliGRAM(s) Oral every 12 hours  cefepime   IVPB 1000 milliGRAM(s) IV Intermittent every 12 hours  chlorhexidine 2% Cloths 1 Application(s) Topical <User Schedule>  influenza  Vaccine (HIGH DOSE) 0.7 milliLiter(s) IntraMuscular once  metoprolol succinate ER 12.5 milliGRAM(s) Oral two times a day  neomycin/bacitracin/polymyxin Topical Ointment 1 Application(s) Topical daily  norepinephrine Infusion 0.05 MICROgram(s)/kG/Min (5.95 mL/Hr) IV Continuous <Continuous>  pantoprazole    Tablet 40 milliGRAM(s) Oral before breakfast  vancomycin  IVPB      vancomycin  IVPB 1000 milliGRAM(s) IV Intermittent every 24 hours    MEDICATIONS  (PRN):  acetaminophen     Tablet .. 325 milliGRAM(s) Oral every 4 hours PRN Temp greater or equal to 38C (100.4F)    sodium chloride 0.9%.: Solution, 1000 milliLiter(s) infuse at 75 mL/Hr  Provider's Contact #: [phone]  sodium chloride 0.9% Bolus:   500 milliLiter(s), IV Bolus, once, infuse over 30 Minute(s), Stop After 1 Doses  Provider's Contact #: [phone]  sodium chloride 0.9% Bolus:   500 milliLiter(s), IV Bolus, once, infuse over 30 Minute(s), Stop After 1 Doses  Provider's Contact #: [phone]      LABS:                        10.4   6.92  )-----------( 153      ( 15 Feb 2023 07:21 )             36.3     02-15    138  |  108  |  45<H>  ----------------------------<  85  5.4<H>   |  21  |  1.7<H>    Ca    9.1      15 Feb 2023 07:21  Phos  4.2     02-15  Mg     1.8     02-15    TPro  6.1  /  Alb  2.8<L>  /  TBili  0.4  /  DBili  0.2  /  AST  36  /  ALT  23  /  AlkPhos  90  02-15    PT/INR - ( 2023 22:23 )   PT: 17.80 sec;   INR: 1.54 ratio         PTT - ( 2023 22:23 )  PTT:40.4 sec  Urinalysis Basic - ( 2023 00:00 )    Color: Yellow / Appearance: Cloudy / S.020 / pH: x  Gluc: x / Ketone: Negative  / Bili: Negative / Urobili: 1.0 mg/dL   Blood: x / Protein: 30 mg/dL / Nitrite: Negative   Leuk Esterase: Large / RBC: 3-5 /HPF / WBC >50 /HPF   Sq Epi: x / Non Sq Epi: Few /HPF / Bacteria: TNTC /HPF        Venous Blood Gas:   @ 22:34  7.27/57/25/26/37.6  VBG Lactate: 3.10      CARDIAC MARKERS ( 15 Feb 2023 07:21 )  x     / 0.02 ng/mL / x     / x     / x      CARDIAC MARKERS ( 2023 22:23 )  x     / 0.03 ng/mL / x     / x     / x                    RADIOLOGY: Today I personally interpreted the latest CXR and other pertinent films.

## 2023-02-15 NOTE — PROGRESS NOTE ADULT - SUBJECTIVE AND OBJECTIVE BOX
Patient seen and evaluated this am, comfortable in bed, no complaints      T(F): 98.6 (02-15-23 @ 15:00), Max: 98.6 (02-15-23 @ 15:00)  HR: 69 (02-15-23 @ 16:00)  BP: 129/60 (02-15-23 @ 16:00)  RR: 20 (02-15-23 @ 16:00)  SpO2: 95% (02-15-23 @ 16:00) (87% - 100%)    PHYSICAL EXAM:  GENERAL: NAD  HEAD:  Atraumatic, Normocephalic  EYES: EOMI, PERRLA, conjunctiva and sclera clear  NERVOUS SYSTEM:   no focal deficits   CHEST/LUNG:  bilateral rhonchi  HEART: Regular rate and rhythm; No murmurs, rubs, or gallops  ABDOMEN: Soft, Nontender, Nondistended; Bowel sounds present  EXTREMITIES:  2+ Peripheral Pulses, No clubbing, cyanosis, or edema    LABS  02-15    138  |  108  |  45<H>  ----------------------------<  85  5.4<H>   |  21  |  1.7<H>    Ca    9.1      15 Feb 2023 07:21  Phos  4.2     02-15  Mg     1.8     02-15    TPro  6.1  /  Alb  2.8<L>  /  TBili  0.4  /  DBili  0.2  /  AST  36  /  ALT  23  /  AlkPhos  90  02-15                          10.4   6.92  )-----------( 153      ( 15 Feb 2023 07:21 )             36.3     PT/INR - ( 13 Feb 2023 22:23 )   PT: 17.80 sec;   INR: 1.54 ratio         PTT - ( 13 Feb 2023 22:23 )  PTT:40.4 sec    CARDIAC ENZYMES    Troponin T, Serum: 0.02 ng/mL (02-15-23 @ 07:21)  Troponin T, Serum: 0.03 ng/mL (02-13-23 @ 22:23)    < from: Xray Chest 1 View- PORTABLE-Urgent (Xray Chest 1 View- PORTABLE-Urgent .) (02.15.23 @ 14:35) >  Impression:    Bilateral opacities/effusions, unchanged      < end of copied text >    Culture Results:   >100,000 CFU/ml Gram Negative Rods (02-14-23)    RADIOLOGY  < from: Xray Chest 1 View- PORTABLE-Urgent (Xray Chest 1 View- PORTABLE-Urgent .) (02.15.23 @ 14:35) >    Impression:    Bilateral opacities/effusions, unchanged    < end of copied text >    MEDICATIONS  (STANDING):  apixaban 2.5 milliGRAM(s) Oral every 12 hours  cefepime   IVPB 1000 milliGRAM(s) IV Intermittent every 12 hours  chlorhexidine 2% Cloths 1 Application(s) Topical <User Schedule>  metoprolol succinate ER 12.5 milliGRAM(s) Oral two times a day  neomycin/bacitracin/polymyxin Topical Ointment 1 Application(s) Topical daily  norepinephrine Infusion 0.05 MICROgram(s)/kG/Min (5.95 mL/Hr) IV Continuous <Continuous>  pantoprazole    Tablet 40 milliGRAM(s) Oral before breakfast  vancomycin  IVPB 1000 milliGRAM(s) IV Intermittent every 24 hours    MEDICATIONS  (PRN):  acetaminophen     Tablet .. 325 milliGRAM(s) Oral every 4 hours PRN Temp greater or equal to 38C (100.4F)

## 2023-02-15 NOTE — PROGRESS NOTE ADULT - ASSESSMENT
CKD stage 3   -Cr worse today   - pt well known to me  PNA / ARF  aspiration / dysphagia  UTI  bladder ca s/p urostomy  head and neck Ca s/p XRT  wt loss  CAD / CABG / CHF / afib / aicd  CVA  DM2  PVD    plan:    check vanco trough, avoid nephrotoxicity especially with rising Cr  renal dose cefepime per ID  hold lasix PO   encourage po hydration  f/u urine cultures   urostomy care  if Cr worsens further, then obtain renal sono  dysphagia diet   d/w nursing  icu care

## 2023-02-15 NOTE — SWALLOW VFSS/MBS ASSESSMENT ADULT - UNSUCCESSFUL STRATEGIES TRIALED DURING PROCEDURE
supraglottic swallow/chin tuck/hard swallow/head turn to the right/head turn to the left/nonproductive volitional cough following clinician cue

## 2023-02-15 NOTE — SWALLOW VFSS/MBS ASSESSMENT ADULT - COMMENTS
Pt with hx throat cancer and s/p radiation tx (2017) admitted with multifocal PNA and overt s/s aspiration at the bedside, therefore warranting instrumental swallow assessment to objectively assess oropharyngeal swallow function

## 2023-02-15 NOTE — SWALLOW VFSS/MBS ASSESSMENT ADULT - ORAL PHASE
Reduced anterior - posterior transport/Residue in oral cavity/Incomplete tongue to palate contact/Uncontrolled bolus / spillover in perri-pharynx

## 2023-02-15 NOTE — PROGRESS NOTE ADULT - SUBJECTIVE AND OBJECTIVE BOX
Patient is seen and examined at the bed side, is afebrile.      REVIEW OF SYSTEMS: All other review systems are negative      ALLERGIES: No Known Allergies        ICU Vital Signs Last 24 Hrs  T(C): 35.9 (15 Feb 2023 19:00), Max: 37 (15 Feb 2023 15:00)  T(F): 96.6 (15 Feb 2023 19:00), Max: 98.6 (15 Feb 2023 15:00)  HR: 82 (15 Feb 2023 19:00) (58 - 82)  BP: 151/67 (15 Feb 2023 19:00) (108/58 - 151/67)  BP(mean): 97 (15 Feb 2023 19:00) (79 - 101)  ABP: --  ABP(mean): --  RR: 10 (15 Feb 2023 19:00) (10 - 29)  SpO2: 93% (15 Feb 2023 19:) (93% - 100%)    O2 Parameters below as of 15 Feb 2023 15:00  Patient On (Oxygen Delivery Method): room air        PHYSICAL EXAM:  GENERAL: Not in distress   CHEST/LUNG:  Aire ntry bilaterally  HEART: s1 and s2 present  ABDOMEN:  Nontender and  Nondistended  EXTREMITIES: No pedal  edema  CNS: Awake and Alert      LABS:                        10.4   6.92  )-----------( 153      ( 15 Feb 2023 07:21 )             36.3       02-15    138  |  108  |  45<H>  ----------------------------<  85  5.4<H>   |  21  |  1.7<H>    Ca    9.1      15 Feb 2023 07:21  Phos  4.2     02-15  Mg     1.8     02-15    TPro  6.1  /  Alb  2.8<L>  /  TBili  0.4  /  DBili  0.2  /  AST  36  /  ALT  23  /  AlkPhos  90  02-15      Urinalysis Basic - ( 2023 00:00 )  Color: Yellow / Appearance: Cloudy / S.020 / pH: x  Gluc: x / Ketone: Negative  / Bili: Negative / Urobili: 1.0 mg/dL   Blood: x / Protein: 30 mg/dL / Nitrite: Negative   Leuk Esterase: Large / RBC: 3-5 /HPF / WBC >50 /HPF   Sq Epi: x / Non Sq Epi: Few /HPF / Bacteria: TNTC /HPF        MEDICATIONS  (STANDING):  apixaban 2.5 milliGRAM(s) Oral every 12 hours  cefepime   IVPB 1000 milliGRAM(s) IV Intermittent every 12 hours  chlorhexidine 2% Cloths 1 Application(s) Topical <User Schedule>  influenza  Vaccine (HIGH DOSE) 0.7 milliLiter(s) IntraMuscular once  metoprolol succinate ER 12.5 milliGRAM(s) Oral two times a day  neomycin/bacitracin/polymyxin Topical Ointment 1 Application(s) Topical daily  norepinephrine Infusion 0.05 MICROgram(s)/kG/Min (5.95 mL/Hr) IV Continuous <Continuous>  pantoprazole    Tablet 40 milliGRAM(s) Oral before breakfast  vancomycin  IVPB      vancomycin  IVPB 1000 milliGRAM(s) IV Intermittent every 24 hours    MEDICATIONS  (PRN):  acetaminophen     Tablet .. 325 milliGRAM(s) Oral every 4 hours PRN Temp greater or equal to 38C (100.4F)        RADIOLOGY & ADDITIONAL TESTS:    < from: Xray Chest 1 View- PORTABLE-Urgent (Xray Chest 1 View- PORTABLE-Urgent .) (02.15.23 @ 14:35) >  Impression:    Bilateral opacities/effusions, unchanged    < end of copied text >  < from: Xray Tibia + Fibula 2 Views, Left (23 @ 22:33) >  No acute fracture or dislocation noted. Degenerative changes seen at the   ankle and knee joint. Plantar calcaneal spur and posterior calcaneal   enthesophyte present. Surgical clips present along the posterior aspect   of the leg. Soft tissue swelling seen surrounding the ankle. No evidence   of soft tissue gas. Atherosclerotic vascular calcifications noted.    < end of copied text >  MRSA/MSSA PCR (23 @ 18:02)   MRSA PCR Result.: Negative:  Culture - Urine (23 @ 00:00)   Specimen Source: Clean Catch Clean Catch (Midstream)   Culture Results:   >100,000 CFU/ml Klebsiella pneumoniae     Culture - Blood (23 @ 22:23)   Specimen Source: .Blood Blood-Peripheral   Culture Results:   No growth to date.     Culture - Blood (23 @ 22:23)   Specimen Source: .Blood Blood-Peripheral   Culture Results:   No growth to date.              Patient is seen and examined at the bed side, is afebrile. The blood cultures have no growth to date and Urine culture grew Klebsiella, sensitivities is pending.       REVIEW OF SYSTEMS: All other review systems are negative      ALLERGIES: No Known Allergies        ICU Vital Signs Last 24 Hrs  T(C): 35.9 (15 Feb 2023 19:00), Max: 37 (15 Feb 2023 15:00)  T(F): 96.6 (15 Feb 2023 19:00), Max: 98.6 (15 Feb 2023 15:00)  HR: 82 (15 Feb 2023 19:00) (58 - 82)  BP: 151/67 (15 Feb 2023 19:00) (108/58 - 151/67)  BP(mean): 97 (15 Feb 2023 19:) (79 - 101)  ABP: --  ABP(mean): --  RR: 10 (15 Feb 2023 19:00) (10 - 29)  SpO2: 93% (15 Feb 2023 19:) (93% - 100%)    O2 Parameters below as of 15 Feb 2023 15:00  Patient On (Oxygen Delivery Method): room air        PHYSICAL EXAM:  GENERAL: Not in distress   CHEST/LUNG: Not using accessory muscles   HEART: s1 and s2 present  ABDOMEN:  Nontender and  Nondistended  EXTREMITIES: No pedal  edema  CNS: Awake and Alert      LABS:                        10.4   6.92  )-----------( 153      ( 15 Feb 2023 07:21 )             36.3       02-15    138  |  108  |  45<H>  ----------------------------<  85  5.4<H>   |  21  |  1.7<H>    Ca    9.1      15 Feb 2023 07:21  Phos  4.2     02-15  Mg     1.8     02-15    TPro  6.1  /  Alb  2.8<L>  /  TBili  0.4  /  DBili  0.2  /  AST  36  /  ALT  23  /  AlkPhos  90  02-15      Urinalysis Basic - ( 2023 00:00 )  Color: Yellow / Appearance: Cloudy / S.020 / pH: x  Gluc: x / Ketone: Negative  / Bili: Negative / Urobili: 1.0 mg/dL   Blood: x / Protein: 30 mg/dL / Nitrite: Negative   Leuk Esterase: Large / RBC: 3-5 /HPF / WBC >50 /HPF   Sq Epi: x / Non Sq Epi: Few /HPF / Bacteria: TNTC /HPF        MEDICATIONS  (STANDING):  apixaban 2.5 milliGRAM(s) Oral every 12 hours  cefepime   IVPB 1000 milliGRAM(s) IV Intermittent every 12 hours  chlorhexidine 2% Cloths 1 Application(s) Topical <User Schedule>  influenza  Vaccine (HIGH DOSE) 0.7 milliLiter(s) IntraMuscular once  metoprolol succinate ER 12.5 milliGRAM(s) Oral two times a day  neomycin/bacitracin/polymyxin Topical Ointment 1 Application(s) Topical daily  norepinephrine Infusion 0.05 MICROgram(s)/kG/Min (5.95 mL/Hr) IV Continuous <Continuous>  pantoprazole    Tablet 40 milliGRAM(s) Oral before breakfast  vancomycin  IVPB      vancomycin  IVPB 1000 milliGRAM(s) IV Intermittent every 24 hours    MEDICATIONS  (PRN):  acetaminophen     Tablet .. 325 milliGRAM(s) Oral every 4 hours PRN Temp greater or equal to 38C (100.4F)        RADIOLOGY & ADDITIONAL TESTS:    < from: Xray Chest 1 View- PORTABLE-Urgent (Xray Chest 1 View- PORTABLE-Urgent .) (02.15.23 @ 14:35) >  Impression:    Bilateral opacities/effusions, unchanged      < from: Xray Tibia + Fibula 2 Views, Left (23 @ 22:33) >  No acute fracture or dislocation noted. Degenerative changes seen at the   ankle and knee joint. Plantar calcaneal spur and posterior calcaneal   enthesophyte present. Surgical clips present along the posterior aspect   of the leg. Soft tissue swelling seen surrounding the ankle. No evidence   of soft tissue gas. Atherosclerotic vascular calcifications noted.        MICROBIOLOGY DATA:    MRSA/MSSA PCR (23 @ 18:02)   MRSA PCR Result.: Negative:    Culture - Urine (23 @ 00:00)   Specimen Source: Clean Catch Clean Catch (Midstream)   Culture Results: >100,000 CFU/ml Klebsiella pneumoniae     Culture - Blood (23 @ 22:23)   Specimen Source: .Blood Blood-Peripheral   Culture Results: No growth to date.     Culture - Blood (23 @ 22:23)   Specimen Source: .Blood Blood-Peripheral   Culture Results: No growth to date.

## 2023-02-16 NOTE — SWALLOW BEDSIDE ASSESSMENT ADULT - ADDITIONAL RECOMMENDATIONS
Ongoing education. Palliative/hospice consults.
ENT consult; wife reports worsening vocal quality. Aspiration precautions. No straws. Modified Barium Swallow Study planned for tomorrow.

## 2023-02-16 NOTE — SWALLOW BEDSIDE ASSESSMENT ADULT - NS ASR SWALLOW FINDINGS DISCUS
Dr. George, Dr. Lopez, wife, 2 daughters/Physician/Nursing/Patient/Family
GENA [place], Dr. Curran, wife/Physician/Nursing/Patient/Family

## 2023-02-16 NOTE — GOALS OF CARE CONVERSATION - ADVANCED CARE PLANNING - CONVERSATION DETAILS
Called and spoke with Judy over the phone. Palliative care introduced.  She was able to provide a clinical history and hospital course for the patient.    We discussed the patient's eating/dysphagia.  He has not passed speech and swallow due to aspiration, and PEG/artificial nutrition is being recommended. The family has wanted ongoing medical management and full code, but does not wish for PEG placement. They wish for the patient to eat.    We discussed the risks of eating by mouth at length, including aspiration, ongoing pneumonia, need for intubation, and cardiac arrest/death.  Judy is aware of these risks. She wishes for the patient to remain Full Code with no PEG and ongoing comfort feeds despite the above risks. She would want the patient to be intubated and receive CPR if needed.    The speech therapist and Dr. Deng alerted.

## 2023-02-16 NOTE — SWALLOW BEDSIDE ASSESSMENT ADULT - SLP PERTINENT HISTORY OF CURRENT PROBLEM
88-year-old male with past medical history of bladder cancer, throat cancer, CVA in 2015, CAD, CABG, a-fib on eliquis and with defibrillator presents with complaint of shortness of breath.  Patient's wife at bedside states shortness of breath began about 3 hours prior to arrival.  States patient has been altered since 6 PM.  Patient has chronic UTIs, has been on ciprofloxacin for the past 3 days (prescribed by nephrologist Dr. Contreras) for recent UTI. Denies fever/chills, chest pain, pleuritic chest pain, abdominal pain, n/v/d.
88-year-old male with past medical history of bladder cancer, throat cancer, CVA in 2015, CAD, CABG, a-fib on eliquis and with defibrillator presents with complaint of shortness of breath.  Patient's wife at bedside states shortness of breath began about 3 hours prior to arrival.  States patient has been altered since 6 PM.  Patient has chronic UTIs, has been on ciprofloxacin for the past 3 days (prescribed by nephrologist Dr. Contreras) for recent UTI. Denies fever/chills, chest pain, pleuritic chest pain, abdominal pain, n/v/d.

## 2023-02-16 NOTE — SWALLOW BEDSIDE ASSESSMENT ADULT - SLP GENERAL OBSERVATIONS
Pt received alert in bed with family present at bedside, palliative MD Dr. Lopez on phone
Pt received alert, +3L O2 via NC, mildly dysphonic, [place] w/ wife at bedside

## 2023-02-16 NOTE — PROGRESS NOTE ADULT - SUBJECTIVE AND OBJECTIVE BOX
Patient seen and evaluated this am, comfortable in bed, no complaints  Family and patient is requested pleasure feed despite patient failed swallow eval. Had GOC discussion with palliative care team      MEDICATIONS  (STANDING):  apixaban 2.5 milliGRAM(s) Oral every 12 hours  cefepime   IVPB 1000 milliGRAM(s) IV Intermittent every 12 hours  chlorhexidine 2% Cloths 1 Application(s) Topical <User Schedule>  influenza  Vaccine (HIGH DOSE) 0.7 milliLiter(s) IntraMuscular once  metoprolol succinate ER 12.5 milliGRAM(s) Oral two times a day  neomycin/bacitracin/polymyxin Topical Ointment 1 Application(s) Topical daily  norepinephrine Infusion 0.05 MICROgram(s)/kG/Min (5.95 mL/Hr) IV Continuous <Continuous>  pantoprazole    Tablet 40 milliGRAM(s) Oral before breakfast  sodium chloride 0.9%. 1000 milliLiter(s) (40 mL/Hr) IV Continuous <Continuous>    MEDICATIONS  (PRN):  acetaminophen     Tablet .. 325 milliGRAM(s) Oral every 4 hours PRN Temp greater or equal to 38C (100.4F)    VITALS  T(F): 96.6 (02-16-23 @ 15:21), Max: 96.9 (02-16-23 @ 04:50)  HR: 61 (02-16-23 @ 13:00) (61 - 69)  BP: 132/76 (02-16-23 @ 13:00) (128/63 - 144/72)  RR: 18 (02-16-23 @ 13:00) (18 - 20)  SpO2: 94% (02-16-23 @ 00:00) (94% - 94%)  Height (cm): 180.3 (02-16-23 @ 00:37)  Weight (kg): 63.7 (02-16-23 @ 00:37)  BMI (kg/m2): 19.6 (02-16-23 @ 00:37)  BSA (m2): 1.81 (02-16-23 @ 00:37)    PHYSICAL EXAM:  GENERAL: NAD  HEAD:  Atraumatic, Normocephalic  EYES: EOMI, PERRLA, conjunctiva and sclera clear  NERVOUS SYSTEM:   no focal deficits   CHEST/LUNG:  bilateral rhonchi  HEART: Regular rate and rhythm; No murmurs, rubs, or gallops  ABDOMEN: Soft, Nontender, Nondistended; Bowel sounds present  EXTREMITIES:  2+ Peripheral Pulses, No clubbing, cyanosis, or edema    LABS  02-16    140  |  108  |  49<H>  ----------------------------<  97  5.0   |  22  |  2.1<H>    Ca    9.5      16 Feb 2023 07:39  Phos  4.2     02-15  Mg     1.9     02-16    TPro  6.6  /  Alb  3.0<L>  /  TBili  0.5  /  DBili  x   /  AST  48<H>  /  ALT  39  /  AlkPhos  118<H>  02-16    LIVER FUNCTIONS - ( 16 Feb 2023 07:39 )  Alb: 3.0 g/dL / Pro: 6.6 g/dL / ALK PHOS: 118 U/L / ALT: 39 U/L / AST: 48 U/L / GGT: x                                 11.0   6.45  )-----------( 172      ( 16 Feb 2023 07:39 )             36.9     CARDIAC MARKERS ( 15 Feb 2023 07:21 )  x     / 0.02 ng/mL / x     / x     / x        MICROBIOLOGY  Culture - Urine (collected 02-14-23 @ 00:00)  Source: Clean Catch Clean Catch (Midstream)  Preliminary Report (02-15-23 @ 22:34):    >100,000 CFU/ml Klebsiella pneumoniae  Organism: Klebsiella pneumoniae ESBL (02-16-23 @ 17:14)  Organism: Klebsiella pneumoniae ESBL (02-16-23 @ 17:14)      -  Amikacin: S <=16      -  Amoxicillin/Clavulanic Acid: S <=8/4 Consider reserving for cystitis when ampicillin/sulbactam is resistant      -  Ampicillin: R >16 These ampicillin results predict results for amoxicillin      -  Ampicillin/Sulbactam: R >16/8 Enterobacter, Klebsiella aerogenes, Citrobacter, and Serratia may develop resistance during prolonged therapy (3-4 days)      -  Aztreonam: R >16      -  Cefazolin: R >16 For uncomplicated UTI with K. pneumoniae, E. coli, or P. mirablis: JUSTIN <=16 is sensitive and JUSTIN >=32 is resistant. This also predicts results for oral agents cefaclor, cefdinir, cefpodoxime, cefprozil, cefuroxime axetil, cephalexin and locarbef for uncomplicated UTI. Note that some isolates may be susceptible to these agents while testing resistant to cefazolin.      -  Cefepime: R >16      -  Ceftriaxone: R >32 Enterobacter, Klebsiella aerogenes, Citrobacter, and Serratia may develop resistance during prolonged therapy      -  Cefuroxime: R >16      -  Ciprofloxacin: R >2      -  Ertapenem: S <=0.5      -  Gentamicin: S <=2      -  Imipenem: S <=1      -  Levofloxacin: R >4      -  Meropenem: S <=1      -  Nitrofurantoin: I 64 Should not be used to treat pyelonephritis      -  Piperacillin/Tazobactam: S <=8      -  Tobramycin: S <=2      -  Trimethoprim/Sulfamethoxazole: R >2/38      Method Type: JUSTIN    Culture - Blood (collected 02-13-23 @ 22:23)  Source: .Blood Blood-Peripheral  Preliminary Report (02-15-23 @ 18:02):    No growth to date.    Culture - Blood (collected 02-13-23 @ 22:23)  Source: .Blood Blood-Peripheral  Preliminary Report (02-15-23 @ 18:02):    No growth to date.        IMAGE          < from: Xray Chest 1 View- PORTABLE-Urgent (Xray Chest 1 View- PORTABLE-Urgent .) (02.15.23 @ 14:35) >  Impression:    Bilateral opacities/effusions, unchanged      < end of copied text >    Culture Results:   >100,000 CFU/ml Gram Negative Rods (02-14-23)    RADIOLOGY  < from: Xray Chest 1 View- PORTABLE-Urgent (Xray Chest 1 View- PORTABLE-Urgent .) (02.15.23 @ 14:35) >    Impression:    Bilateral opacities/effusions, unchanged    < end of copied text >    MEDICATIONS  (STANDING):  apixaban 2.5 milliGRAM(s) Oral every 12 hours  cefepime   IVPB 1000 milliGRAM(s) IV Intermittent every 12 hours  chlorhexidine 2% Cloths 1 Application(s) Topical <User Schedule>  metoprolol succinate ER 12.5 milliGRAM(s) Oral two times a day  neomycin/bacitracin/polymyxin Topical Ointment 1 Application(s) Topical daily  norepinephrine Infusion 0.05 MICROgram(s)/kG/Min (5.95 mL/Hr) IV Continuous <Continuous>  pantoprazole    Tablet 40 milliGRAM(s) Oral before breakfast  vancomycin  IVPB 1000 milliGRAM(s) IV Intermittent every 24 hours    MEDICATIONS  (PRN):  acetaminophen     Tablet .. 325 milliGRAM(s) Oral every 4 hours PRN Temp greater or equal to 38C (100.4F)

## 2023-02-16 NOTE — PROGRESS NOTE ADULT - ASSESSMENT
DALE   - Cr worse   - fair urien output from urostomy  CKD stage 3  PNA / ARF  aspiration / dysphagia  UTI, klebsiella  bladder ca s/p urostomy  head and neck Ca s/p XRT  wt loss  CAD / CABG / CHF / afib / aicd  CVA  DM2  PVD    plan:    abx per ID, careful with cefepime neurotoxicity with worsening GFR  cont to hold lasix PO   renal sono ordered  will give very gentle hydration since NPO, closely monitor for pulm edema  d/w daughters at bedside  clarify goals of care, code status, daughters understand pt is declining and closer to end of life  f/u speech and swallow, consider comfort feeds   DALE   - Cr worse   - fair urine output from urostomy  CKD stage 3  PNA / ARF  aspiration / dysphagia  UTI, klebsiella  bladder ca s/p urostomy  head and neck Ca s/p XRT  wt loss  CAD / CABG / CHF / afib / aicd  CVA  DM2  PVD    plan:    abx per ID, careful with cefepime neurotoxicity with worsening GFR  cont to hold lasix PO   renal sono ordered  will give very gentle hydration (NS @ 40cc/hr ordered) since NPO and worsening DALE, closely monitor for pulm edema  d/w daughters at bedside  clarify goals of care, code status, daughters understand pt is declining and closer to end of life  f/u speech and swallow, consider comfort feeds

## 2023-02-16 NOTE — PROGRESS NOTE ADULT - SUBJECTIVE AND OBJECTIVE BOX
NEPHROLOGY FOLLOW UP NOTE    transferred to floor  renal function worse  BP's fair  fair uop via urostomy  currently NPO      PAST MEDICAL & SURGICAL HISTORY:  CAD (coronary artery disease)      Bladder cancer      CVA (cerebral vascular accident)  2015      Afib      Presence of urostomy  2003      S/P CABG x 4  1991      Iliac artery aneurysm        S/P ICD (internal cardiac defibrillator) procedure        Allergies:  No Known Allergies    Home Medications Reviewed    SOCIAL HISTORY:  Denies ETOH,Smoking,   FAMILY HISTORY:  Family history of early CAD          REVIEW OF SYSTEMS:  CONSTITUTIONAL: No weakness, fevers or chills  EYES/ENT: No visual changes;  No vertigo or throat pain   NECK: No pain or stiffness  RESPIRATORY: + cough, wheezing, hemoptysis; No shortness of breath  CARDIOVASCULAR: No chest pain or palpitations.  GASTROINTESTINAL: No abdominal or epigastric pain. No nausea, vomiting, or hematemesis; No diarrhea or constipation. No melena or hematochezia.  GENITOURINARY: No dysuria, frequency, foamy urine, urinary urgency, incontinence or hematuria  NEUROLOGICAL: No numbness or weakness  SKIN: No itching, burning, rashes, or lesions   VASCULAR: No bilateral lower extremity edema.   All other review of systems is negative unless indicated above.    PHYSICAL EXAM:  Constitutional: thin, frail  HEENT: anicteric sclera, oropharynx clear, MMM  Neck: No JVD  Respiratory: CTAB, no wheezes, rales or rhonchi  Cardiovascular: S1, S2, RRR  Gastrointestinal: BS+, soft, NT/ND  Extremities: No cyanosis or clubbing. No peripheral edema  Neurological: A/O x 3, no focal deficits  Psychiatric: Normal mood, normal affect  : No CVA tenderness. + urostomy  Skin: No rashes    Hospital Medications:   MEDICATIONS  (STANDING):  apixaban 2.5 milliGRAM(s) Oral every 12 hours  cefepime   IVPB 1000 milliGRAM(s) IV Intermittent every 12 hours  chlorhexidine 2% Cloths 1 Application(s) Topical <User Schedule>  influenza  Vaccine (HIGH DOSE) 0.7 milliLiter(s) IntraMuscular once  metoprolol succinate ER 12.5 milliGRAM(s) Oral two times a day  neomycin/bacitracin/polymyxin Topical Ointment 1 Application(s) Topical daily  norepinephrine Infusion 0.05 MICROgram(s)/kG/Min (5.95 mL/Hr) IV Continuous <Continuous>  pantoprazole    Tablet 40 milliGRAM(s) Oral before breakfast        VITALS:  T(F): 96.9 (23 @ 04:50), Max: 98.6 (02-15-23 @ 15:00)  HR: 66 (23 @ 04:50)  BP: 133/71 (23 @ 04:50)  RR: 18 (23 @ 04:50)  SpO2: 94% (23 @ 00:00)  Wt(kg): --     @ 07:01  -  02-15 @ 07:00  --------------------------------------------------------  IN: 1526 mL / OUT: 350 mL / NET: 1176 mL    02-15 @ 07:01  -   07:00  --------------------------------------------------------  IN: 380 mL / OUT: 1150 mL / NET: -770 mL      Height (cm): 180.3 ( @ 00:37)  Weight (kg): 63.7 ( @ 00:37)  BMI (kg/m2): 19.6 ( @ 00:37)  BSA (m2): 1.81 ( @ 00:37)    LABS:      140  |  108  |  49<H>  ----------------------------<  97  5.0   |  22  |  2.1<H>    Ca    9.5      2023 07:39  Phos  4.2     02-15  Mg     1.9         TPro  6.6  /  Alb  3.0<L>  /  TBili  0.5  /  DBili      /  AST  48<H>  /  ALT  39  /  AlkPhos  118<H>                            11.0   6.45  )-----------( 172      ( 2023 07:39 )             36.9       Urine Studies:  Urinalysis Basic - ( 2023 00:00 )    Color: Yellow / Appearance: Cloudy / S.020 / pH:   Gluc:  / Ketone: Negative  / Bili: Negative / Urobili: 1.0 mg/dL   Blood:  / Protein: 30 mg/dL / Nitrite: Negative   Leuk Esterase: Large / RBC: 3-5 /HPF / WBC >50 /HPF   Sq Epi:  / Non Sq Epi: Few /HPF / Bacteria: TNTC /HPF          RADIOLOGY & ADDITIONAL STUDIES:

## 2023-02-16 NOTE — SWALLOW BEDSIDE ASSESSMENT ADULT - COMMENTS
Modified Barium Swallow Study 2/15: Recommendations:   · Diagnostic Impressions	Patient presents with a severe pharyngeal dysphagia characterized by gross (>25% of bolus) aspiration of all consistencies trialed and severe amount of residue within pharyngeal structures. This is likely a result of throat cx and subsequent radiation; pt never received dysphagia intervention post-radiation and it can be assumed that pharyngeal muscles have atrophied. Pt is at significantly heightened risk of developing aspiration PNA given decline in mobility, reduced cognitive abilities, immunosuppression, and current multifocal PNA.     In depth conversation had with patient, his wife, and his granddaughter re: results of MBSS. SLP explained risks vs. benefits of continuing PO intake (risks: PNA, malnutrition, dehydration, benefits: quality of life) vs. PEG tube (risks: surgery, reduced quality of life, infection, benefits: may reduce risk of developing aspiration PNA). Pt and family verbalized understanding and expressed that they do not want a feeding tube. They are open to discussing palliative care. Therefore, physician to continue GOC discussion.  · Recommended Consistencies	Consider (a) PO comfort feeding despite high risk for PNA/malnutrition/dehydration vs. (b) NPO w/ long term enteral feeding route which does not eliminate risk for aspiration PNA and will likely negatively impact quality of life    Today, SLP f/u with patient and family (wife, two daughters) to re-educate on results of MBSS and options going forward (PO comfort feeding despite known risks vs. long term enteral feeding route). SLP in conjunction w/ palliative MD, Dr. Lopez, discussed risks of PO intake (aspiration PNA which could lead to further complications, malnutrition, dehydration) vs. risks of PEG (surgery/anesthesia, still at risk for aspiration PNA, negative impact on quality of life). All questions answered. Pt and family continue to endorse their want for PO comfort feeding despite known risks.
CXR: Impression:    Bilateral opacities/effusions. Multifocal PNA per chart

## 2023-02-17 NOTE — PROGRESS NOTE ADULT - ASSESSMENT
88-year-old male with past medical history of bladder cancer, throat cancer, CVA in 2015, CAD, CABG, a-fib on eliquis and with defibrillator presents with complaint of shortness of breath.      IMPRESSION  #Septic Shock   #Multifocal Pneumonia - MRSA PCR is negative   #Recent UTI on cipro - Urine culture grew Klebsiella and Kingella  #Throat cancer s/p radiation  #Hx of Bladder Cancer   #Obesity BMI (kg/m2): 20.1  #Abx allergy:     Recommendations  - Urine Cx reviewed -- growing ESBL E coli -- unclear if UTI vs cefepime concentrating well in urine as clinically improved since admission   - continue cefepime 1g q 12 hours for now - plan to continue until 2/20   - monitor for fevers -- if febrile or clinical change, can switch to meropenem     Please call or message on [employer] Teams if with any questions.  Spectra [phone]

## 2023-02-17 NOTE — PROGRESS NOTE ADULT - SUBJECTIVE AND OBJECTIVE BOX
MEDICINE ATTENDING PROGRESS NOTE  88-year-old male with past medical history of bladder cancer, throat cancer, CVA in 2015, CAD, CABG, a-fib on eliquis and with defibrillator presents with complaint of shortness of breath, found to have septic shock possibly in setting of severe dysphagia in setting of Throat cancer s/p radiation. Patient was directly admitted to MICU where he was on pressor support. Now, off pressor, patient is downgraded to medical floor.    Interval/Overnight events:  02/17/23: Seen and examined at bedside. Pt states he was aspirated last night after eating. Today, patient was tachypneic on exam. Vitals otherwise stable.  Will get ABG and CXR. Will place patient on oxygen. Pleasure feed as per patient and family. ID: continue cefepime 1g q 12 hours until 2/20. if febrile or clinical change, can switch to meropenem     ROS:   General: denies fever, chills, insomnia, depression, weight change  Skin: denies itch, jaundice   Resp: bilateral rhonchi  CV: denies PND  GI: denies N/V/D constipation   : denies d/c, itching, hematuria, dysuria   EXT: denies pain, swelling, erythema   Musculoskeletal: denies low back pain, joint pain, swelling   Neuro: denies headache, weakness, syncope    MEDICATIONS  (STANDING):  apixaban 2.5 milliGRAM(s) Oral every 12 hours  cefepime   IVPB 1000 milliGRAM(s) IV Intermittent every 12 hours  chlorhexidine 2% Cloths 1 Application(s) Topical <User Schedule>  influenza  Vaccine (HIGH DOSE) 0.7 milliLiter(s) IntraMuscular once  insulin lispro (ADMELOG) corrective regimen sliding scale   SubCutaneous three times a day before meals  metoprolol succinate ER 12.5 milliGRAM(s) Oral two times a day  neomycin/bacitracin/polymyxin Topical Ointment 1 Application(s) Topical daily  norepinephrine Infusion 0.05 MICROgram(s)/kG/Min (5.95 mL/Hr) IV Continuous <Continuous>  pantoprazole    Tablet 40 milliGRAM(s) Oral before breakfast  sodium chloride 0.9%. 1000 milliLiter(s) (40 mL/Hr) IV Continuous <Continuous>  sodium zirconium cyclosilicate 10 Gram(s) Oral once    MEDICATIONS  (PRN):  acetaminophen     Tablet .. 325 milliGRAM(s) Oral every 4 hours PRN Temp greater or equal to 38C (100.4F)    VITALS  T(F): 95.2 (02-17-23 @ 05:04), Max: 96.6 (02-16-23 @ 15:21)  HR: 80 (02-17-23 @ 04:36) (61 - 80)  BP: 142/93 (02-17-23 @ 04:36) (132/76 - 142/93)  RR: 18 (02-17-23 @ 04:36) (18 - 18)  SpO2: --    PHYSICAL EXAM  Gen- NAD, AAOx3  HEENT- no pallor, anicteric, moist mucous membranes  CVS- S1/S2, no m/r/g  Chest- CTA b/l no w/r/c, no use of accessory muscles, good inspiratory effort, speaking in full sentences  Abd- soft, nt, nd  Ext- no edema, pulses intact b/l  Neuro-CN II-XII intact;    LABS/IMAGING  02-17    140  |  110  |  49<H>  ----------------------------<  104<H>  5.1<H>   |  20  |  2.1<H>    Ca    9.5      17 Feb 2023 05:53  Mg     2.0     02-17    TPro  6.8  /  Alb  3.0<L>  /  TBili  0.5  /  DBili  x   /  AST  44<H>  /  ALT  40  /  AlkPhos  121<H>  02-17    LIVER FUNCTIONS - ( 17 Feb 2023 05:53 )  Alb: 3.0 g/dL / Pro: 6.8 g/dL / ALK PHOS: 121 U/L / ALT: 40 U/L / AST: 44 U/L / GGT: x                                 10.9   6.39  )-----------( 187      ( 17 Feb 2023 05:53 )             36.6     ABG - ( 17 Feb 2023 09:38 )  pH, Arterial: 7.35  pH, Blood: x     /  pCO2: 39    /  pO2: 65    / HCO3: 22    / Base Excess: -3.8  /  SaO2: 94.7      MICROBIOLOGY  Culture - Urine (collected 02-14-23 @ 00:00)  Source: Clean Catch Clean Catch (Midstream)  Preliminary Report (02-15-23 @ 22:34):    >100,000 CFU/ml Klebsiella pneumoniae  Organism: Klebsiella pneumoniae ESBL (02-16-23 @ 17:14)  Organism: Klebsiella pneumoniae ESBL (02-16-23 @ 17:14)      -  Amikacin: S <=16      -  Amoxicillin/Clavulanic Acid: S <=8/4 Consider reserving for cystitis when ampicillin/sulbactam is resistant      -  Ampicillin: R >16 These ampicillin results predict results for amoxicillin      -  Ampicillin/Sulbactam: R >16/8 Enterobacter, Klebsiella aerogenes, Citrobacter, and Serratia may develop resistance during prolonged therapy (3-4 days)      -  Aztreonam: R >16      -  Cefazolin: R >16 For uncomplicated UTI with K. pneumoniae, E. coli, or P. mirablis: JUSTIN <=16 is sensitive and JUSTIN >=32 is resistant. This also predicts results for oral agents cefaclor, cefdinir, cefpodoxime, cefprozil, cefuroxime axetil, cephalexin and locarbef for uncomplicated UTI. Note that some isolates may be susceptible to these agents while testing resistant to cefazolin.      -  Cefepime: R >16      -  Ceftriaxone: R >32 Enterobacter, Klebsiella aerogenes, Citrobacter, and Serratia may develop resistance during prolonged therapy      -  Cefuroxime: R >16      -  Ciprofloxacin: R >2      -  Ertapenem: S <=0.5      -  Gentamicin: S <=2      -  Imipenem: S <=1      -  Levofloxacin: R >4      -  Meropenem: S <=1      -  Nitrofurantoin: I 64 Should not be used to treat pyelonephritis      -  Piperacillin/Tazobactam: S <=8      -  Tobramycin: S <=2      -  Trimethoprim/Sulfamethoxazole: R >2/38      Method Type: JUSTIN    Culture - Blood (collected 02-13-23 @ 22:23)  Source: .Blood Blood-Peripheral  Preliminary Report (02-15-23 @ 18:02):    No growth to date.    Culture - Blood (collected 02-13-23 @ 22:23)  Source: .Blood Blood-Peripheral  Preliminary Report (02-15-23 @ 18:02):    No growth to date.    RADIOLOGY  < from: Xray Chest 1 View- PORTABLE-Urgent (Xray Chest 1 View- PORTABLE-Urgent .) (02.15.23 @ 14:35) >    Impression:    Bilateral opacities/effusions, unchanged    < end of copied text >

## 2023-02-17 NOTE — CONSULT NOTE ADULT - ASSESSMENT
88 year old man with history of bladder cancer, throat cancer, CVA presents with SOB. Hospital course complicated by sepsis, aspiration in the setting of ongoing dysphagia, complicated UTI.  Palliative care consulted for GOC.     Spoke with patient and family at multiple time today and yesterday via telehealth.  Yesterday, family/patient wished for the patient to have comfort feeds and full code despite knowing risk of aspiration. He had an aspiration event last night.    Today, spoke with Armando regarding options moving forward: PEG vs comfort measures only/comfort feeds.  Patient noted at time of initial visit that he did not want additional surgeries. The family spoke together with the patient and they wished to initiate comfort measures only.    All questions answered.  To initiate comfort measures only today.  Dr. Deng to fill out the MOLST.    MEDD (morphine equivalent daily dose):      See Recs below.    Please call [phone] with questions or concerns 24/7.   We will continue to follow.     Discussed with primary MD.

## 2023-02-17 NOTE — CHART NOTE - NSCHARTNOTEFT_GEN_A_CORE
PALLIATIVE MEDICINE INTERDISCIPLINARY TEAM NOTE    Provider:  [   ]Social Work   [   ]          [   ] Initial visit [   ] Follow up    Family or contact name / phone #   Met with: [   ] Patient  [   ] Family  [   ] Other:    Primary Language: [   ] English [   ] Other*:                      *Interpretation provided by:    SUPPORT DIAGNOSES            (Check all that apply)  [   ] Psychosocial spiritual assessment (PSSA)  [   ] EOL issues  [   ] Cultural / spiritual concerns  [   ] Pain / suffering  [   ] Dementia / AMS  [   ] Other:  [   ] AD issues  [   ] Grief / loss / sadness  [   ] Discharge issues  [   ] Distress / coping    PSYCHOSOCIAL ASSESSMENT OF PATIENT         (Check all that apply)  [   ] Initial Assessment            [   ] Reassessment          [   ] Not Applicable this visit    Pain/suffering acuity:  [   ] None to mild (0-3)           [   ] Moderate (4-6)        [   ] High (7-10)    Mental Status:  [   ] Alert/oriented (x3)          [   ] Confused/Altered(x2/x1)         [   ] Non-resp    Functional status:  [   ] Independent w ADLs      [   ] Needs Assistance             [   ] Bedbound/Full Care    Coping:  [   ] Coping well                     [   ] Coping w/difficulty            [   ] Poor coping    Support system:  [   ] Strong                              [   ] Adequate                        [   ] Inadequate    SPIRITUAL ASSESSMENT  Yazdanism/Spiritual practice: __Catholic_________________________    Role of organized Orthodoxy:  [   ] Important                     [ x ] Some (fam tradition, cultural)               [   ] None    Effects on medical care:  [   ] Yes, _____________________________________                         [ x  ] None    Cultural/Methodist need:  [   ] Yes, _____________________________________                         [ x ] None    Refer to Pastoral Care:  [   ] Yes           [x ] No, not at this time    SERVICE PROVIDED  [   ]PSSA                                                                      [   ]Discharge support / facilitation  [   ]AD / goals of care counseling                                  [   ]EOL / death / bereavement counseling  [   ]Counseling / support                                               [   ] Family meeting  [ x  ]Prayer / sacrament / ritual                                      [   ] Referral   [   ]Other                                                                       NOTE and Plan of Care (PoC): Family made decision for CMO and Hospice care. Family stated they are tired and need a break.  MOLST was completed an signed. I was an empathetic listener and support for Pt and Family. I will follow up as needed.

## 2023-02-17 NOTE — PROGRESS NOTE ADULT - ASSESSMENT
DALE   - Cr stabilizing    - no hydro b/l   CKD stage 3  PNA / ARF  aspiration / dysphagia  UTI, klebsiella, MDR  bladder ca s/p urostomy  head and neck Ca s/p XRT  wt loss  CAD / CABG / CHF / afib / aicd  CVA  DM2  PVD    plan:    cont NS @ 40 cc/hr  abx per ID, but monitor for cefepime neuro side effects   cont to hold lasix PO   dysphagia diet / comfort feeds  trend renal function and lytes  monitor urostomy output  lokelma PRN if K+ increases further   d/w hospitalist

## 2023-02-17 NOTE — CONSULT NOTE ADULT - SUBJECTIVE AND OBJECTIVE BOX
NEPHROLOGY CONSULTATION NOTE    · Chief Complaint: The patient is a 88y Male complaining of shortness of breath.  · HPI Objective Statement: 88-year-old male with past medical history of bladder cancer, throat cancer, CVA in 2015, CAD, CABG, a-fib on eliquis and with defibrillator presents with complaint of shortness of breath.  Patient's wife at bedside states shortness of breath began about 3 hours prior to arrival.  States patient has been altered since 6 PM.  Patient has chronic UTIs, has been on ciprofloxacin for the past 3 days (prescribed by nephrologist Dr. Contreras) for recent UTI. Denies fever/chills, chest pain, pleuritic chest pain, abdominal pain, n/v/d.      PAST MEDICAL & SURGICAL HISTORY:  CAD (coronary artery disease)      Bladder cancer      CVA (cerebral vascular accident)        Afib      Presence of urostomy  2003      S/P CABG x 4  1991      Iliac artery aneurysm        S/P ICD (internal cardiac defibrillator) procedure        Allergies:  No Known Allergies    Home Medications Reviewed    SOCIAL HISTORY:  Denies ETOH,Smoking,   FAMILY HISTORY:  Family history of early CAD          REVIEW OF SYSTEMS:  CONSTITUTIONAL: No weakness, fevers or chills  EYES/ENT: No visual changes;  No vertigo or throat pain   NECK: No pain or stiffness  RESPIRATORY: + cough, wheezing, hemoptysis; No shortness of breath  CARDIOVASCULAR: No chest pain or palpitations.  GASTROINTESTINAL: No abdominal or epigastric pain. No nausea, vomiting, or hematemesis; No diarrhea or constipation. No melena or hematochezia.  GENITOURINARY: No dysuria, frequency, foamy urine, urinary urgency, incontinence or hematuria  NEUROLOGICAL: No numbness or weakness  SKIN: No itching, burning, rashes, or lesions   VASCULAR: No bilateral lower extremity edema.   All other review of systems is negative unless indicated above.    PHYSICAL EXAM:  Constitutional: thin, frail  HEENT: anicteric sclera, oropharynx clear, MMM  Neck: No JVD  Respiratory: CTAB, no wheezes, rales or rhonchi  Cardiovascular: S1, S2, RRR  Gastrointestinal: BS+, soft, NT/ND  Extremities: No cyanosis or clubbing. No peripheral edema  Neurological: A/O x 3, no focal deficits  Psychiatric: Normal mood, normal affect  : No CVA tenderness. + urostomy  Skin: No rashes    Hospital Medications:   MEDICATIONS  (STANDING):  apixaban 2.5 milliGRAM(s) Oral every 12 hours  cefepime   IVPB 1000 milliGRAM(s) IV Intermittent every 12 hours  chlorhexidine 2% Cloths 1 Application(s) Topical <User Schedule>  influenza  Vaccine (HIGH DOSE) 0.7 milliLiter(s) IntraMuscular once  metoprolol succinate ER 12.5 milliGRAM(s) Oral two times a day  neomycin/bacitracin/polymyxin Topical Ointment 1 Application(s) Topical daily  norepinephrine Infusion 0.05 MICROgram(s)/kG/Min (5.95 mL/Hr) IV Continuous <Continuous>  pantoprazole    Tablet 40 milliGRAM(s) Oral before breakfast  sodium chloride 0.9%. 1000 milliLiter(s) (75 mL/Hr) IV Continuous <Continuous>  vancomycin  IVPB            VITALS:  T(F): 96.3 (23 @ 17:22), Max: 101.7 (23 @ 21:57)  HR: 69 (23 @ 17:22)  BP: 119/58 (23 @ 17:22)  RR: 17 (23 @ 17:22)  SpO2: 99% (23 @ 17:22)  Wt(kg): --     @ 07:01  -   @ 07:00  --------------------------------------------------------  IN: 316 mL / OUT: 0 mL / NET: 316 mL     @ 07:01  -   @ 18:13  --------------------------------------------------------  IN: 1180 mL / OUT: 150 mL / NET: 1030 mL      Height (cm): 177.8 ( @ 21:20)  Weight (kg): 63.5 ( @ 21:20)  BMI (kg/m2): 20.1 (02-13 @ 21:20)  BSA (m2): 1.79 ( @ 21:20)    LABS:      139  |  105  |  39<H>  ----------------------------<  122<H>  4.8   |  25  |  1.3    Ca    9.9      2023 22:23    TPro  7.0  /  Alb  3.4<L>  /  TBili  0.8  /  DBili      /  AST  30  /  ALT  23  /  AlkPhos  106                            11.5   12.64 )-----------( 183      ( 2023 22:23 )             39.3       Urine Studies:  Urinalysis Basic - ( 2023 00:00 )    Color: Yellow / Appearance: Cloudy / S.020 / pH:   Gluc:  / Ketone: Negative  / Bili: Negative / Urobili: 1.0 mg/dL   Blood:  / Protein: 30 mg/dL / Nitrite: Negative   Leuk Esterase: Large / RBC: 3-5 /HPF / WBC >50 /HPF   Sq Epi:  / Non Sq Epi: Few /HPF / Bacteria: TNTC /HPF          RADIOLOGY & ADDITIONAL STUDIES:  
CARDIOLOGY CONSULT NOTE     CHIEF COMPLAINT/REASON FOR CONSULT:    HPI:    · Chief Complaint: The patient is a 88y Male complaining of shortness of breath.  · HPI Objective Statement: 88-year-old male with past medical history of bladder cancer, throat cancer, CVA in 2015, CAD, CABG, a-fib on eliquis and with defibrillator presents with complaint of shortness of breath.  Patient's wife at bedside states shortness of breath began about 3 hours prior to arrival.  States patient has been altered since 6 PM.  Patient has chronic UTIs, has been on ciprofloxacin for the past 3 days (prescribed by nephrologist Dr. Contreras) for recent UTI. Denies fever/chills, chest pain, pleuritic chest pain, abdominal pain, n/v/d.   (14 Feb 2023 04:06)      PAST MEDICAL & SURGICAL HISTORY:  CAD (coronary artery disease)      Bladder cancer      CVA (cerebral vascular accident)  2015      Afib      Presence of urostomy  2003      S/P CABG x 4  1991      Iliac artery aneurysm  2002      S/P ICD (internal cardiac defibrillator) procedure          Cardiac Risks:   [ ]HTN, [ ] DM, [ ] Smoking, [ ] FH,  [ ] Lipids        MEDICATIONS:  MEDICATIONS  (STANDING):  apixaban 2.5 milliGRAM(s) Oral every 12 hours  cefepime   IVPB      cefepime   IVPB 1000 milliGRAM(s) IV Intermittent every 8 hours  chlorhexidine 2% Cloths 1 Application(s) Topical <User Schedule>  influenza  Vaccine (HIGH DOSE) 0.7 milliLiter(s) IntraMuscular once  metoprolol succinate ER 12.5 milliGRAM(s) Oral two times a day  norepinephrine Infusion 0.05 MICROgram(s)/kG/Min (5.95 mL/Hr) IV Continuous <Continuous>  pantoprazole    Tablet 40 milliGRAM(s) Oral before breakfast  sodium chloride 0.9%. 1000 milliLiter(s) (75 mL/Hr) IV Continuous <Continuous>  vancomycin  IVPB          FAMILY HISTORY:  Family history of early CAD        SOCIAL HISTORY:        Allergies    No Known Allergies    	    REVIEW OF SYSTEMS:  CONSTITUTIONAL: No fever, weight loss, or fatigue  EYES: No eye pain, visual disturbances, or discharge  ENMT:  No difficulty hearing, tinnitus, vertigo; No sinus or throat pain  NECK: No pain or stiffness  RESPIRATORY: No cough, wheezing, chills or hemoptysis; No Shortness of Breath  CARDIOVASCULAR: See above  GASTROINTESTINAL: No abdominal or epigastric pain. No nausea, vomiting, or hematemesis; No diarrhea or constipation. No melena or hematochezia.  GENITOURINARY: No dysuria, frequency, hematuria, or incontinence  NEUROLOGICAL: No headaches, memory loss, loss of strength, numbness, or tremors  SKIN: No itching, burning, rashes, or lesions   	    [ ] All others negative	  [ ] Unable to obtain    PHYSICAL EXAM:  T(C): 36 (02-14-23 @ 07:10), Max: 38.7 (02-13-23 @ 21:57)  HR: 53 (02-14-23 @ 07:19) (53 - 92)  BP: 109/53 (02-14-23 @ 07:19) (70/36 - 126/56)  RR: 19 (02-14-23 @ 07:19) (16 - 25)  SpO2: 99% (02-14-23 @ 07:19) (91% - 99%)  Wt(kg): --  I&O's Summary    13 Feb 2023 07:01  -  14 Feb 2023 07:00  --------------------------------------------------------  IN: 238 mL / OUT: 0 mL / NET: 238 mL        Appearance: Normal	  Psychiatry: A & O x 3, Mood & affect appropriate  HEENT:   Normal oral mucosa, PERRL, EOMI	  Lymphatic: No lymphadenopathy  Cardiovascular: Normal S1 S2,RRR, No JVD, No murmurs  Respiratory: Lungs clear to auscultation	  Gastrointestinal:  Soft, Non-tender, + BS	  Skin: No rashes, No ecchymoses, No cyanosis	  Neurologic: Non-focal  Extremities: Normal range of motion, No clubbing, cyanosis or edema  Vascular: Peripheral pulses palpable 2+ bilaterally      ECG:  pend	    	  LABS:	 	    CARDIAC MARKERS:          Serum Pro-Brain Natriuretic Peptide: 94768 pg/mL (02-13 @ 22:23)                            11.5   12.64 )-----------( 183      ( 13 Feb 2023 22:23 )             39.3     02-13    139  |  105  |  39<H>  ----------------------------<  122<H>  4.8   |  25  |  1.3    Ca    9.9      13 Feb 2023 22:23    TPro  7.0  /  Alb  3.4<L>  /  TBili  0.8  /  DBili  x   /  AST  30  /  ALT  23  /  AlkPhos  106  02-13    PT/INR - ( 13 Feb 2023 22:23 )   PT: 17.80 sec;   INR: 1.54 ratio         PTT - ( 13 Feb 2023 22:23 )  PTT:40.4 sec  proBNP: Serum Pro-Brain Natriuretic Peptide: 72692 pg/mL (02-13 @ 22:23)              
Patient is a 88y old  Male who presents with a chief complaint of sepsis (2023 10:47)      HPI:    · Chief Complaint: The patient is a 88y Male complaining of shortness of breath.  · HPI Objective Statement: 88-year-old male with past medical history of bladder cancer, throat cancer, CVA in 2015, CAD, CABG, a-fib on eliquis and with defibrillator presents with complaint of shortness of breath.  Patient's wife at bedside states shortness of breath began about 3 hours prior to arrival.  States patient has been altered since 6 PM.  Patient has chronic UTIs, has been on ciprofloxacin for the past 3 days (prescribed by nephrologist Dr. Contreras) for recent UTI. Denies fever/chills, chest pain, pleuritic chest pain, abdominal pain, n/v/d.   (2023 04:06)      PAST MEDICAL & SURGICAL HISTORY:  CAD (coronary artery disease)      Bladder cancer      CVA (cerebral vascular accident)        Afib      Presence of urostomy  2003      S/P CABG x 4        Iliac artery aneurysm        S/P ICD (internal cardiac defibrillator) procedure          SOCIAL HX:   Smoking                         ETOH                            Other    FAMILY HISTORY:  Family history of early CAD    :  No known cardiovacular family hisotry     Review Of Systems:     All ROS are negative except per HPI       Allergies    No Known Allergies    Intolerances      PHYSICAL EXAM    ICU Vital Signs Last 24 Hrs  T(C): 35.7 (2023 11:14), Max: 38.7 (2023 21:57)  T(F): 96.2 (2023 11:14), Max: 101.7 (2023 21:57)  HR: 55 (2023 11:14) (53 - 92)  BP: 120/55 (2023 11:14) (70/36 - 126/56)  BP(mean): 79 (2023 11:14) (46 - 90)  ABP: --  ABP(mean): --  RR: 19 (2023 13:00) (11 - 29)  SpO2: 100% (2023 11:14) (91% - 100%)    O2 Parameters below as of 2023 11:14  Patient On (Oxygen Delivery Method): nasal cannula,JGurrieri  O2 Flow (L/min): 3      CONSTITUTIONAL:  Appears comfortable    ENT:   Airway patent,   Mouth with normal mucosa.   No thrush    EYES:   pupils equal,   round and reactive to light.    CARDIAC:   Normal rate,   Irregular rhythm.    Heart sounds S1, S2.   No edema    Vascular:   normal systolic impulse  no bruits    RESPIRATORY:   No wheezing   Normal chest expansion  No use of accessory muscles    GASTROINTESTINAL:  Abdomen soft   Non-tender,   No guarding,   + BS    GENITOURINARY  normal genitalia for sex  no edema    MUSCULOSKELETAL:   Range of motion is not limited,  Nno clubbing, cyanosis    NEUROLOGICAL:   Alert and not oriented   right sided weakness more than left side  Pertinent DTRs normal    SKIN:   No rashes, ulcer on the left      PSYCHIATRIC:   Normal mood and affect.   No apparent risk to self or others.      23 @ 07:  -  23 @ 07:00  --------------------------------------------------------  IN:    Norepinephrine: 16 mL    sodium chloride 0.9%: 300 mL  Total IN: 316 mL    OUT:  Total OUT: 0 mL    Total NET: 316 mL      23 @ 07:01  -  23 @ 13:57  --------------------------------------------------------  IN:    Norepinephrine: 9 mL    Oral Fluid: 400 mL    sodium chloride 0.9%: 225 mL  Total IN: 634 mL    OUT:  Total OUT: 0 mL    Total NET: 634 mL      LABS:                          11.5   12.64 )-----------( 183      ( 2023 22:23 )             39.3                                               02-13    139  |  105  |  39<H>  ----------------------------<  122<H>  4.8   |  25  |  1.3    Ca    9.9      2023 22:23    TPro  7.0  /  Alb  3.4<L>  /  TBili  0.8  /  DBili  x   /  AST  30  /  ALT  23  /  AlkPhos  106  02-13      PT/INR - ( 2023 22:23 )   PT: 17.80 sec;   INR: 1.54 ratio         PTT - ( 2023 22:23 )  PTT:40.4 sec                                       Urinalysis Basic - ( 2023 00:00 )    Color: Yellow / Appearance: Cloudy / S.020 / pH: x  Gluc: x / Ketone: Negative  / Bili: Negative / Urobili: 1.0 mg/dL   Blood: x / Protein: 30 mg/dL / Nitrite: Negative   Leuk Esterase: Large / RBC: 3-5 /HPF / WBC >50 /HPF   Sq Epi: x / Non Sq Epi: Few /HPF / Bacteria: TNTC /HPF        CARDIAC MARKERS ( 2023 22:23 )  x     / 0.03 ng/mL / x     / x     / x                                                LIVER FUNCTIONS - ( 2023 22:23 )  Alb: 3.4 g/dL / Pro: 7.0 g/dL / ALK PHOS: 106 U/L / ALT: 23 U/L / AST: 30 U/L / GGT: x                                                                                             X-Rays reviewed:                                                                                    ECHO    CXR interpreted by me:    MEDICATIONS  (STANDING):  apixaban 2.5 milliGRAM(s) Oral every 12 hours  cefepime   IVPB      cefepime   IVPB 1000 milliGRAM(s) IV Intermittent every 8 hours  chlorhexidine 2% Cloths 1 Application(s) Topical <User Schedule>  influenza  Vaccine (HIGH DOSE) 0.7 milliLiter(s) IntraMuscular once  metoprolol succinate ER 12.5 milliGRAM(s) Oral two times a day  norepinephrine Infusion 0.05 MICROgram(s)/kG/Min (5.95 mL/Hr) IV Continuous <Continuous>  pantoprazole    Tablet 40 milliGRAM(s) Oral before breakfast  sodium chloride 0.9%. 1000 milliLiter(s) (75 mL/Hr) IV Continuous <Continuous>  vancomycin  IVPB        MEDICATIONS  (PRN):  acetaminophen     Tablet .. 325 milliGRAM(s) Oral every 4 hours PRN Temp greater or equal to 38C (100.4F)        
SCHUYLER GRECO  88y, Male  Allergy: No Known Allergies      CHIEF COMPLAINT: sepsis (2023 09:51)      LOS      HPI:    · Chief Complaint: The patient is a 88y Male complaining of shortness of breath.  · HPI Objective Statement: 88-year-old male with past medical history of bladder cancer, throat cancer, CVA in , CAD, CABG, a-fib on eliquis and with defibrillator presents with complaint of shortness of breath.  Patient's wife at bedside states shortness of breath began about 3 hours prior to arrival.  States patient has been altered since 6 PM.  Patient has chronic UTIs, has been on ciprofloxacin for the past 3 days (prescribed by nephrologist Dr. Contreras) for recent UTI. Denies fever/chills, chest pain, pleuritic chest pain, abdominal pain, n/v/d.   (2023 04:06)      INFECTIOUS DISEASE HISTORY:  History as above.  Presents with acute onset shortness of breath.   Currently on 3L NC.   Has been having dysphagia for several years, with family members noticing coughing when tolerating PO.   Febrile in ED.   Denies abdominal pain, nausea, vomiting.   Denies pleuritic chest pain.       PAST MEDICAL & SURGICAL HISTORY:  CAD (coronary artery disease)      Bladder cancer      CVA (cerebral vascular accident)        Afib      Presence of urostomy        S/P CABG x 4        Iliac artery aneurysm        S/P ICD (internal cardiac defibrillator) procedure          FAMILY HISTORY  Family history of early CAD        SOCIAL HISTORY  Social History:  Denies etoh use, drug use       ROS  General: Denies rigors, nightsweats  HEENT: Denies headache, rhinorrhea, sore throat, eye pain  CV: Denies CP, palpitations  PULM: Denies wheezing, hemoptysis  GI: Denies hematemesis, hematochezia, melena  : Denies discharge, hematuria  MSK: Denies arthralgias, myalgias  SKIN: Denies rash, lesions  NEURO: Denies paresthesias, weakness  PSYCH: Denies depression, anxiety    VITALS:  T(F): 96.8, Max: 101.7 (23 @ 21:57)  HR: 60  BP: 104/84  RR: 24Vital Signs Last 24 Hrs  T(C): 36 (2023 07:10), Max: 38.7 (2023 21:57)  T(F): 96.8 (2023 07:10), Max: 101.7 (2023 21:57)  HR: 60 (2023 08:27) (53 - 92)  BP: 104/84 (2023 08:27) (70/36 - 126/56)  BP(mean): 90 (2023 08:27) (46 - 90)  RR: 24 (2023 09:09) (16 - 29)  SpO2: 94% (2023 08:27) (91% - 99%)    Parameters below as of 2023 08:27  Patient On (Oxygen Delivery Method): nasal cannula  O2 Flow (L/min): 3      PHYSICAL EXAM:  Gen: NAD, resting in bed  HEENT: Normocephalic, atraumatic  Neck: supple, no lymphadenopathy  CV: Regular rate & regular rhythm  Lungs: decreased BS at bases, no fremitus  Abdomen: Soft, BS present  Ext: Warm, well perfused  Neuro: non focal, awake  Skin: no rash, no erythema  Lines: no phlebitis    TESTS & MEASUREMENTS:                        11.5   12.64 )-----------( 183      ( 2023 22:23 )             39.3     02-13    139  |  105  |  39<H>  ----------------------------<  122<H>  4.8   |  25  |  1.3    Ca    9.9      2023 22:23    TPro  7.0  /  Alb  3.4<L>  /  TBili  0.8  /  DBili  x   /  AST  30  /  ALT  23  /  AlkPhos  106  02-13      LIVER FUNCTIONS - ( 2023 22:23 )  Alb: 3.4 g/dL / Pro: 7.0 g/dL / ALK PHOS: 106 U/L / ALT: 23 U/L / AST: 30 U/L / GGT: x           Urinalysis Basic - ( 2023 00:00 )    Color: Yellow / Appearance: Cloudy / S.020 / pH: x  Gluc: x / Ketone: Negative  / Bili: Negative / Urobili: 1.0 mg/dL   Blood: x / Protein: 30 mg/dL / Nitrite: Negative   Leuk Esterase: Large / RBC: 3-5 /HPF / WBC >50 /HPF   Sq Epi: x / Non Sq Epi: Few /HPF / Bacteria: TNTC /HPF          Blood Gas Venous - Lactate: 3.10 mmol/L (23 @ 22:34)      INFECTIOUS DISEASES TESTING      RADIOLOGY & ADDITIONAL TESTS:  I have personally reviewed the last Chest xray  CXR      CT      CARDIOLOGY TESTING  12 Lead ECG:   Ventricular Rate 62 BPM    Atrial Rate 40 BPM    QRS Duration 118 ms    Q-T Interval 450 ms    QTC Calculation(Bazett) 456 ms    R Axis -30 degrees    T Axis 177 degrees    Diagnosis Line Atrial fibrillation  Left axis deviation  Non-specific intra-ventricular conduction delay    Confirmed by MARY ELLEN CLIFFORD MD ([id number]) on 2023 10:16:16 AM (23 @ 08:41)  12 Lead ECG:   Ventricular Rate 83 BPM    Atrial Rate 61 BPM    QRS Duration 112 ms    Q-T Interval 390 ms    QTC Calculation(Bazett) 458 ms    R Axis -32 degrees    T Axis 158 degrees    Diagnosis Line Atrial fibrillation  Left axis deviation  Non-specific intra-ventricular conduction delay    Confirmed by MARY ELLEN CLIFFORD MD ([id number]) on 2023 10:15:58 AM (23 @ 22:19)      MEDICATIONS  apixaban 2.5 Oral every 12 hours  cefepime   IVPB     cefepime   IVPB 1000 IV Intermittent every 8 hours  chlorhexidine 2% Cloths 1 Topical <User Schedule>  influenza  Vaccine (HIGH DOSE) 0.7 IntraMuscular once  metoprolol succinate ER 12.5 Oral two times a day  norepinephrine Infusion 0.05 IV Continuous <Continuous>  pantoprazole    Tablet 40 Oral before breakfast  sodium chloride 0.9%. 1000 IV Continuous <Continuous>  vancomycin  IVPB         Weight  Weight (kg): 63.5 (23 @ 21:20)    ANTIBIOTICS:  cefepime   IVPB      cefepime   IVPB 1000 milliGRAM(s) IV Intermittent every 8 hours  vancomycin  IVPB          ALLERGIES:  No Known Allergies        
CC:  SOB    HPI:    · Chief Complaint: The patient is a 88y Male complaining of shortness of breath.  · HPI Objective Statement: 88-year-old male with past medical history of bladder cancer, throat cancer, CVA in 2015, CAD, CABG, a-fib on eliquis and with defibrillator presents with complaint of shortness of breath.  Patient's wife at bedside states shortness of breath began about 3 hours prior to arrival.  States patient has been altered since 6 PM.  Patient has chronic UTIs, has been on ciprofloxacin for the past 3 days (prescribed by nephrologist Dr. Contreras) for recent UTI. Denies fever/chills, chest pain, pleuritic chest pain, abdominal pain, n/v/d.   (14 Feb 2023 04:06)    PERTINENT PM/SXH:   CAD (coronary artery disease)    Bladder cancer    CVA (cerebral vascular accident)    Afib      Presence of urostomy    S/P CABG x 4    Iliac artery aneurysm    S/P ICD (internal cardiac defibrillator) procedure      FAMILY HISTORY:  Family history of early CAD      ITEMS NOT CHECKED ARE NOT PRESENT    SOCIAL HISTORY:   Significant other/partner[ ]  Children[ ]  Denominational/Spirituality:  Substance hx:  [ ]   Tobacco hx:  [ ]   Alcohol hx: [ ]   Living Situation: [x ]Home  [ ]Long term care  [ ]Rehab [ ]Other  Home Services: [ ] HHA [ ] Visting RN [ ] Hospice  Occupation:  Home Opioid hx:  [ ] Y [ ] N [ ] I-Stop Reference No: Reference #: [id number] - no meds    ADVANCE DIRECTIVES:    [ ] Full Code [x] DNR  MOLST  [ ]  Living Will  [ ]   DECISION MAKER(s):  [ ] Health Care Proxy(s)  [ x] Surrogate(s)  [ ] Guardian           Name(s): Phone Number(s): Wife adrian and granddaughter Dominga    BASELINE (I)ADL(s) (prior to admission):  Arnold: [ ]Total  [ ] Moderate [ ]Dependent  Palliative Performance Status Version 2:         %    [email or web address]    Allergies  No Known Allergies    MEDICATIONS  (STANDING):  apixaban 2.5 milliGRAM(s) Oral every 12 hours  influenza  Vaccine (HIGH DOSE) 0.7 milliLiter(s) IntraMuscular once  metoprolol succinate ER 12.5 milliGRAM(s) Oral two times a day  neomycin/bacitracin/polymyxin Topical Ointment 1 Application(s) Topical daily  pantoprazole    Tablet 40 milliGRAM(s) Oral before breakfast    MEDICATIONS  (PRN):  acetaminophen     Tablet .. 325 milliGRAM(s) Oral every 4 hours PRN Temp greater or equal to 38C (100.4F)  haloperidol    Injectable 0.5 milliGRAM(s) IV Push every 6 hours PRN Agitation  HYDROmorphone  Injectable 0.5 milliGRAM(s) IV Push every 2 hours PRN Moderate pain (4-6), Severe pain (7-10), Respiratory rate greater than 22  LORazepam   Injectable 0.5 milliGRAM(s) IV Push every 4 hours PRN Anxiety    PRESENT SYMPTOMS: [ ]Unable to obtain due to poor mentation   Source if other than patient:  [ ]Family   [ ]Team     Pain: [ ]yes [x]no  QOL impact -   Location -                    Aggravating factors -  Quality -  Radiation -  Timing-  Severity (0-10 scale):  Minimal acceptable level (0-10 scale):       Dyspnea:                           [ ]Mild [x]Moderate [ ]Severe [ ]None  Anxiety:                             [ ]Mild [ ]Moderate [ ]Severe [x]None  Fatigue:                             [ ]Mild [ ]Moderate [ ]Severe [x]None  Nausea:                             [ ]Mild [ ]Moderate [ ]Severe [ xNone  Loss of appetite:              [ ]Mild [ ]Moderate [ ]Severe [x]None  Constipation:                    [ ]Mild [ ]Moderate [ ]Severe [x]None    Other Symptoms:  [ xAll other review of systems negative     Palliative Performance Status Version 2:         30   [email or web address]    PHYSICAL EXAM:  Vital Signs Last 24 Hrs  T(C): 35 (17 Feb 2023 13:00), Max: 35.9 (16 Feb 2023 15:21)  T(F): 95 (17 Feb 2023 13:00), Max: 96.6 (16 Feb 2023 15:21)  HR: 67 (17 Feb 2023 13:00) (65 - 80)  BP: 143/72 (17 Feb 2023 13:00) (140/74 - 143/72)  BP(mean): --  RR: 18 (17 Feb 2023 13:00) (18 - 18)  SpO2: --     I&O's Summary    16 Feb 2023 07:01  -  17 Feb 2023 07:00  --------------------------------------------------------  IN: 0 mL / OUT: 1050 mL / NET: -1050 mL        GENERAL:  [x] No acute distress [ ]Lethargic  [ ]Unarousable  [x]Verbal  [ ]Non-Verbal [ ]Cachexia    BEHAVIORAL/PSYCH:  [ xAlert and Oriented x2-3 Anxiety [ ] Delirium [ ] Agitation [ ] Calm   EYES: [x] No scleral icterus [ ] Scleral icterus [ ] Closed  ENMT:  [ ]Dry mouth  [ xNo external oral lesions [ ] No external ear or nose lesions  CARDIOVASCULAR:  [ ]Regular [ ]Irregular [ ]Tachy [x]Not Tachy  [ ]Edmundo [ ] Edema [ ] No edema  PULMONARY:  [ ]Tachypnea  [ ]Audible excessive secretions [ ] No labored breathing [ x] mild labored breathing  GASTROINTESTINAL: [ ]Soft  [ ]Distended  [x ]Not distended [ ]Non tender [ ]Tender  MUSCULOSKELETAL: [x ]No clubbing [ ] clubbing  [ ] No cyanosis [ ] cyanosis  NEUROLOGIC: [ ]No focal deficits  [x ]Follows commands  [ ]Does not follow commands  [ ]Cognitive impairment  [ ]Dysphagia  [ ]Dysarthria  [ ]Paresis   SKIN: [ ] Jaundiced [ x] Non-jaundiced [ ]Rash [ ]No Rash [ ] Warm [ ] Dry  MISC/LINES: [ ] ET tube [ ] Trach [ ]NGT/OGT [ ]PEG [ ]Goss    CRITICAL CARE:  [ ] Shock Present  [ ]Septic [ ]Cardiogenic [ ]Neurologic [ ]Hypovolemic  [ ]  Vasopressors [ ]  Inotropes   [ ]Respiratory failure present [ ]Mechanical ventilation [ ]Non-invasive ventilatory support [ ]High flow  [ ]Acute  [ ]Chronic [ ]Hypoxic  [ ]Hypercarbic [ ]Other  [ ]Other organ failure     LABS: reviewed by me                        10.9   6.39  )-----------( 187      ( 17 Feb 2023 05:53 )             36.6   02-17    140  |  110  |  49<H>  ----------------------------<  104<H>  5.1<H>   |  20  |  2.1<H>    Ca    9.5      17 Feb 2023 05:53  Mg     2.0     02-17    TPro  6.8  /  Alb  3.0<L>  /  TBili  0.5  /  DBili  x   /  AST  44<H>  /  ALT  40  /  AlkPhos  121<H>  02-17    RADIOLOGY & ADDITIONAL STUDIES: reviewed by me  < from: Xray Chest 1 View- PORTABLE-Urgent (Xray Chest 1 View- PORTABLE-Urgent .) (02.17.23 @ 10:02) >  Impression:    Increased bilateral opacities/effusions.    < end of copied text >      EKG: reviewed by me  < from: 12 Lead ECG (02.14.23 @ 08:41) >  Ventricular Rate 62 BPM    Atrial Rate 40 BPM    QRS Duration 118 ms    Q-T Interval 450 ms    QTC Calculation(Bazett) 456 ms    R Axis -30 degrees    T Axis 177 degrees    Diagnosis Line Atrial fibrillation  Left axis deviation  Non-specific intra-ventricular conduction delay    < end of copied text >      PROTEIN CALORIE MALNUTRITION PRESENT: [ ]mild [ ]moderate [ ]severe [ ]underweight [ ]morbid obesity  https://www.andeal.org/vault/2440/web/files/ONC/Table_Clinical%20Characteristics%20to%20Document%20Malnutrition-White%20JV%20et%20al%202012.pdf    Height (cm): 180.3 (02-16-23 @ 00:37)  Weight (kg): 63.7 (02-16-23 @ 00:37)  BMI (kg/m2): 19.6 (02-16-23 @ 00:37)    [ ]PPSV2 < or = to 30% [ ]significant weight loss  [ ]poor nutritional intake  [ ]anasarca      [ ]Artificial Nutrition      REFERRALS:   [x ]Chaplaincy  [ x]Hospice  [ ]Child Life  [x ]Social Work  [ ]Case management [ ]Holistic Therapy     Goals of Care Document:

## 2023-02-17 NOTE — CONSULT NOTE ADULT - CONVERSATION DETAILS
Spoke with patient and family at multiple time today and yesterday via telehealth.  Yesterday, family/patient wished for the patient to have comfort feeds and full code despite knowing risk of aspiration. He had an aspiration event last night.    Today, spoke with Armando regarding options moving forward: PEG vs comfort measures only/comfort feeds.  Patient noted at time of initial visit that he did not want additional surgeries. The family spoke together with the patient and they wished to initiate comfort measures only.    All questions answered.  To initiate comfort measures only today.  Dr. Deng to fill out the MOLST.

## 2023-02-17 NOTE — PROGRESS NOTE ADULT - SUBJECTIVE AND OBJECTIVE BOX
NEPHROLOGY FOLLOW UP NOTE    pt seen and examined  no new complaints      PAST MEDICAL & SURGICAL HISTORY:  CAD (coronary artery disease)      Bladder cancer      CVA (cerebral vascular accident)  2015      Afib      Presence of urostomy  2003      S/P CABG x 4        Iliac artery aneurysm        S/P ICD (internal cardiac defibrillator) procedure        Allergies:  No Known Allergies    Home Medications Reviewed    SOCIAL HISTORY:  Denies ETOH,Smoking,   FAMILY HISTORY:  Family history of early CAD          REVIEW OF SYSTEMS:  CONSTITUTIONAL: No weakness, fevers or chills  EYES/ENT: No visual changes;  No vertigo or throat pain   NECK: No pain or stiffness  RESPIRATORY: + cough, wheezing, hemoptysis; No shortness of breath  CARDIOVASCULAR: No chest pain or palpitations.  GASTROINTESTINAL: No abdominal or epigastric pain. No nausea, vomiting, or hematemesis; No diarrhea or constipation. No melena or hematochezia.  GENITOURINARY: No dysuria, frequency, foamy urine, urinary urgency, incontinence or hematuria  NEUROLOGICAL: No numbness or weakness  SKIN: No itching, burning, rashes, or lesions   VASCULAR: No bilateral lower extremity edema.   All other review of systems is negative unless indicated above.    PHYSICAL EXAM:  Constitutional: thin, frail  HEENT: anicteric sclera, oropharynx clear, MMM  Neck: No JVD  Respiratory: CTAB, no wheezes, rales or rhonchi  Cardiovascular: S1, S2, RRR  Gastrointestinal: BS+, soft, NT/ND  Extremities: No cyanosis or clubbing. No peripheral edema  Neurological: A/O x 3, no focal deficits  Psychiatric: Normal mood, normal affect  : No CVA tenderness. + urostomy  Skin: No rashes    Hospital Medications:   MEDICATIONS  (STANDING):  apixaban 2.5 milliGRAM(s) Oral every 12 hours  cefepime   IVPB 1000 milliGRAM(s) IV Intermittent every 12 hours  chlorhexidine 2% Cloths 1 Application(s) Topical <User Schedule>  influenza  Vaccine (HIGH DOSE) 0.7 milliLiter(s) IntraMuscular once  insulin lispro (ADMELOG) corrective regimen sliding scale   SubCutaneous three times a day before meals  metoprolol succinate ER 12.5 milliGRAM(s) Oral two times a day  neomycin/bacitracin/polymyxin Topical Ointment 1 Application(s) Topical daily  norepinephrine Infusion 0.05 MICROgram(s)/kG/Min (5.95 mL/Hr) IV Continuous <Continuous>  pantoprazole    Tablet 40 milliGRAM(s) Oral before breakfast  sodium chloride 0.9%. 1000 milliLiter(s) (40 mL/Hr) IV Continuous <Continuous>        VITALS:  T(F): 95.2 (23 @ 05:04), Max: 96.6 (23 @ 15:21)  HR: 80 (23 @ 04:36)  BP: 142/93 (23 @ 04:36)  RR: 18 (23 @ 04:36)  SpO2: --  Wt(kg): --    02-15 @ 07:01  -   @ 07:00  --------------------------------------------------------  IN: 380 mL / OUT: 1150 mL / NET: -770 mL     @ 07:01  -   @ 07:00  --------------------------------------------------------  IN: 0 mL / OUT: 1050 mL / NET: -1050 mL          LABS:      140  |  110  |  49<H>  ----------------------------<  104<H>  5.1<H>   |  20  |  2.1<H>    Ca    9.5      2023 05:53  Mg     2.0         TPro  6.8  /  Alb  3.0<L>  /  TBili  0.5  /  DBili      /  AST  44<H>  /  ALT  40  /  AlkPhos  121<H>                            10.9   6.39  )-----------( 187      ( 2023 05:53 )             36.6       Urine Studies:  Urinalysis Basic - ( 2023 00:00 )    Color: Yellow / Appearance: Cloudy / S.020 / pH:   Gluc:  / Ketone: Negative  / Bili: Negative / Urobili: 1.0 mg/dL   Blood:  / Protein: 30 mg/dL / Nitrite: Negative   Leuk Esterase: Large / RBC: 3-5 /HPF / WBC >50 /HPF   Sq Epi:  / Non Sq Epi: Few /HPF / Bacteria: TNTC /HPF          RADIOLOGY & ADDITIONAL STUDIES:

## 2023-02-17 NOTE — PROGRESS NOTE ADULT - SUBJECTIVE AND OBJECTIVE BOX
SCHUYLER GRECO  88y, Male  Allergy: No Known Allergies      LOS  3d    CHIEF COMPLAINT: sepsis (16 Feb 2023 19:07)      INTERVAL EVENTS/HPI  - No acute events overnight  - T(F): , Max: 96.6 (02-16-23 @ 15:21)  - Denies any worsening symptoms  - Tolerating medication  - WBC Count: 6.39 (02-17-23 @ 05:53)  WBC Count: 6.45 (02-16-23 @ 07:39)     - Creatinine, Serum: 2.1 (02-17-23 @ 05:53)  Creatinine, Serum: 2.1 (02-16-23 @ 07:39)       ROS  General: Denies rigors, nightsweats  HEENT: Denies headache, rhinorrhea, sore throat, eye pain  CV: Denies CP, palpitations  PULM: Denies wheezing, hemoptysis  GI: Denies hematemesis, hematochezia, melena  : Denies discharge, hematuria  MSK: Denies arthralgias, myalgias  SKIN: Denies rash, lesions  NEURO: Denies paresthesias, weakness  PSYCH: Denies depression, anxiety    VITALS:  T(F): 95.2, Max: 96.6 (02-16-23 @ 15:21)  HR: 80  BP: 142/93  RR: 18Vital Signs Last 24 Hrs  T(C): 35.1 (17 Feb 2023 05:04), Max: 35.9 (16 Feb 2023 15:21)  T(F): 95.2 (17 Feb 2023 05:04), Max: 96.6 (16 Feb 2023 15:21)  HR: 80 (17 Feb 2023 04:36) (61 - 80)  BP: 142/93 (17 Feb 2023 04:36) (132/76 - 142/93)  BP(mean): --  RR: 18 (17 Feb 2023 04:36) (18 - 18)  SpO2: --        PHYSICAL EXAM:  Gen: NAD, resting in bed  HEENT: Normocephalic, atraumatic  Neck: supple, no lymphadenopathy  CV: Regular rate & regular rhythm  Lungs: decreased BS at bases, no fremitus  Abdomen: Soft, BS present  Ext: Warm, well perfused  Neuro: non focal, awake  Skin: no rash, no erythema  Lines: no phlebitis    FH: Non-contributory  Social Hx: Non-contributory    TESTS & MEASUREMENTS:                        10.9   6.39  )-----------( 187      ( 17 Feb 2023 05:53 )             36.6     02-17    140  |  110  |  49<H>  ----------------------------<  104<H>  5.1<H>   |  20  |  2.1<H>    Ca    9.5      17 Feb 2023 05:53  Mg     2.0     02-17    TPro  6.8  /  Alb  3.0<L>  /  TBili  0.5  /  DBili  x   /  AST  44<H>  /  ALT  40  /  AlkPhos  121<H>  02-17      LIVER FUNCTIONS - ( 17 Feb 2023 05:53 )  Alb: 3.0 g/dL / Pro: 6.8 g/dL / ALK PHOS: 121 U/L / ALT: 40 U/L / AST: 44 U/L / GGT: x               Culture - Urine (collected 02-14-23 @ 00:00)  Source: Clean Catch Clean Catch (Midstream)  Preliminary Report (02-16-23 @ 19:23):    >100,000 CFU/ml Klebsiella pneumoniae ESBL    >100,000 CFU/ml Tiffany castro  Organism: Klebsiella pneumoniae ESBL (02-16-23 @ 17:14)  Organism: Klebsiella pneumoniae ESBL (02-16-23 @ 17:14)      -  Amikacin: S <=16      -  Amoxicillin/Clavulanic Acid: S <=8/4 Consider reserving for cystitis when ampicillin/sulbactam is resistant      -  Ampicillin: R >16 These ampicillin results predict results for amoxicillin      -  Ampicillin/Sulbactam: R >16/8 Enterobacter, Klebsiella aerogenes, Citrobacter, and Serratia may develop resistance during prolonged therapy (3-4 days)      -  Aztreonam: R >16      -  Cefazolin: R >16 For uncomplicated UTI with K. pneumoniae, E. coli, or P. mirablis: JUSTIN <=16 is sensitive and JUSTIN >=32 is resistant. This also predicts results for oral agents cefaclor, cefdinir, cefpodoxime, cefprozil, cefuroxime axetil, cephalexin and locarbef for uncomplicated UTI. Note that some isolates may be susceptible to these agents while testing resistant to cefazolin.      -  Cefepime: R >16      -  Ceftriaxone: R >32 Enterobacter, Klebsiella aerogenes, Citrobacter, and Serratia may develop resistance during prolonged therapy      -  Cefuroxime: R >16      -  Ciprofloxacin: R >2      -  Ertapenem: S <=0.5      -  Gentamicin: S <=2      -  Imipenem: S <=1      -  Levofloxacin: R >4      -  Meropenem: S <=1      -  Nitrofurantoin: I 64 Should not be used to treat pyelonephritis      -  Piperacillin/Tazobactam: S <=8      -  Tobramycin: S <=2      -  Trimethoprim/Sulfamethoxazole: R >2/38      Method Type: JUSTIN    Culture - Blood (collected 02-13-23 @ 22:23)  Source: .Blood Blood-Peripheral  Preliminary Report (02-15-23 @ 18:02):    No growth to date.    Culture - Blood (collected 02-13-23 @ 22:23)  Source: .Blood Blood-Peripheral  Preliminary Report (02-15-23 @ 18:02):    No growth to date.        Lactate, Blood: 0.9 mmol/L (02-15-23 @ 07:21)  Lactate, Blood: 1.6 mmol/L (02-14-23 @ 11:30)  Blood Gas Venous - Lactate: 3.10 mmol/L (02-13-23 @ 22:34)      INFECTIOUS DISEASES TESTING  Procalcitonin, Serum: 0.55 (02-15-23 @ 07:21)  MRSA PCR Result.: Negative (02-14-23 @ 18:02)      INFLAMMATORY MARKERS      RADIOLOGY & ADDITIONAL TESTS:  I have personally reviewed the last available Chest xray  CXR  Xray Chest 1 View- PORTABLE-Urgent:   ACC: [id number] EXAM:  XR CHEST PORTABLE URGENT 1V   ORDERED BY: YANELIS VILLEGAS     PROCEDURE DATE:  02/15/2023          INTERPRETATION:  Clinical History / Reason for exam: Short of breath    Comparison : Chest radiograph February 13, 2023.    Technique/Positioning: AP chest.    Findings:    Support devices: Left-sided chest wall ICD    Cardiac/mediastinum/hilum: Unchanged    Lung parenchyma/Pleura: Bilateral opacities/effusions, unchanged    Skeleton/soft tissues: Contrast is seen within the partially imaged   stomach.    Impression:    Bilateral opacities/effusions, unchanged        --- End of Report ---            ELOISA GOODE MD; Attending Radiologist  This document has been electronically signed. Feb 15 2023  2:42PM (02-15-23 @ 14:35)      CT      CARDIOLOGY TESTING  12 Lead ECG:   Ventricular Rate 62 BPM    Atrial Rate 40 BPM    QRS Duration 118 ms    Q-T Interval 450 ms    QTC Calculation(Bazett) 456 ms    R Axis -30 degrees    T Axis 177 degrees    Diagnosis Line Atrial fibrillation  Left axis deviation  Non-specific intra-ventricular conduction delay    Confirmed by MARY ELLEN CLIFFORD MD ([id number]) on 2/14/2023 10:16:16 AM (02-14-23 @ 08:41)  12 Lead ECG:   Ventricular Rate 83 BPM    Atrial Rate 61 BPM    QRS Duration 112 ms    Q-T Interval 390 ms    QTC Calculation(Bazett) 458 ms    R Axis -32 degrees    T Axis 158 degrees    Diagnosis Line Atrial fibrillation  Left axis deviation  Non-specific intra-ventricular conduction delay    Confirmed by MARY ELLEN CLIFFORD MD ([id number]) on 2/14/2023 10:15:58 AM (02-13-23 @ 22:19)      MEDICATIONS  apixaban 2.5 Oral every 12 hours  cefepime   IVPB 1000 IV Intermittent every 12 hours  chlorhexidine 2% Cloths 1 Topical <User Schedule>  influenza  Vaccine (HIGH DOSE) 0.7 IntraMuscular once  insulin lispro (ADMELOG) corrective regimen sliding scale  SubCutaneous three times a day before meals  metoprolol succinate ER 12.5 Oral two times a day  neomycin/bacitracin/polymyxin Topical Ointment 1 Topical daily  norepinephrine Infusion 0.05 IV Continuous <Continuous>  pantoprazole    Tablet 40 Oral before breakfast  sodium chloride 0.9%. 1000 IV Continuous <Continuous>      WEIGHT  Weight (kg): 63.7 (02-16-23 @ 00:37)  Creatinine, Serum: 2.1 mg/dL (02-17-23 @ 05:53)      ANTIBIOTICS:  cefepime   IVPB 1000 milliGRAM(s) IV Intermittent every 12 hours      All available historical records have been reviewed

## 2023-02-17 NOTE — CONSULT NOTE ADULT - PROBLEM SELECTOR RECOMMENDATION 9
-DNR/DNI  -Comfort measures only  -MOLST to be filled out and placed in chart by Dr. Deng of the primary team  -No additional IVF, artificial nutrition, blood draws, vital signs, pressors, antibiotics, escalation of oxygen, escalation of care  -Comfort feeds OK  -OK to continue home meds if patient tolerates PO  -Hospice consulted - OK to consider discharge/dispo if patient stable in 24-48 hours    haloperidol    Injectable 0.5 milliGRAM(s) IV Push every 6 hours PRN Agitation  HYDROmorphone  Injectable 0.5 milliGRAM(s) IV Push every 2 hours PRN Moderate pain (4-6), Severe pain (7-10), Respiratory rate greater than 22  LORazepam   Injectable 0.5 milliGRAM(s) IV Push every 4 hours PRN Anxiety

## 2023-02-17 NOTE — CONSULT NOTE ADULT - PROBLEM SELECTOR RECOMMENDATION 3
In the history of previous CVA  -comfort measures only  -no PEG or artificial nutrition  -comfort feeds OK - aspiration risk known by patient/family

## 2023-02-17 NOTE — PROGRESS NOTE ADULT - ASSESSMENT
88-year-old male with past medical history of bladder cancer, throat cancer, CVA in 2015, CAD, CABG, a-fib on eliquis and with defibrillator presents with complaint of shortness of breath, found to have septic shock possibly in setting of severe dysphagia in setting of Throat cancer s/p radiation. Patient was directly admitted to MICU where he was on pressor support. Now, off pressor, patient is downgraded to medical floor.    #Sepsis (unclear etiology urine vs lungs)   #Acute hypoxic respiratory failure likely due to Aspiration PNA in setting of  severe dysphagia in setting of Throat cancer s/p radiation  #Complicated UTI   - continue IV antibiotics as per ID   - gram negative rods in urine   - follow cultures and procal  - Off levophed   - c/w metoprolol   - continue Eliquis  - Swallow recs appreciated  - ID: continue cefepime 1g q 12 hours until 2/20. if febrile or clinical change, can switch to meropenem     #Hx- UTI, CVA, afib, CAD s/p CABGF, CHF s/p ICD, DM2, PVD, CKD stage 3  #Throat cancer s/p radiation with severe dysphagia  #Hx of Bladder Cancer s/p urostomy  - Resume home meds  - Palliative and Hospice on the case    Spent over 35 min reviewing chart and on coordinating patient care during interdisciplinary rounds     Usman Deng M.D./Matthew  Attending Physician

## 2023-02-18 NOTE — CHART NOTE - NSCHARTNOTEFT_GEN_A_CORE
Spoke with dtr of patient on phone. She is concerned because the patient is very anxious and agitated. She said he is hallucinating and cant sleep. He has received no PRN agitation or anxiety. Discussed starting standing Haldol at twice daily and then adjusting as needed from there. She is agreeable to this.   Attempted to call floor to notify nurse and team but no answer. Order placed.     Will follow up in Am to make sure patient is comfortable.   Call X [phone] PRN.

## 2023-02-18 NOTE — PROGRESS NOTE ADULT - SUBJECTIVE AND OBJECTIVE BOX
MEDICINE ATTENDING PROGRESS NOTE  88-year-old male with past medical history of bladder cancer, throat cancer, CVA in 2015, CAD, CABG, a-fib on eliquis and with defibrillator presents with complaint of shortness of breath, found to have septic shock possibly in setting of severe dysphagia in setting of Throat cancer s/p radiation. Patient was directly admitted to MICU where he was on pressor support. Now, off pressor, patient is downgraded to medical floor.    Interval/Overnight events:  02/18/23: Seen and examined at bedside. Stable. No acute complaints. Now, CMO (comfort care only) per palliative. Pt appears confuses this AM. Vitals stable. Labs and Imaging reviewed. Cont current management. Await hospice placement. MOLTs form signed. Pt is still full code.    02/17/23: Seen and examined at bedside. Pt states he was aspirated last night after eating. Today, patient was tachypneic on exam. Vitals otherwise stable.  Will get ABG and CXR. Will place patient on oxygen. Pleasure feed as per patient and family. ID: continue cefepime 1g q 12 hours until 2/20. if febrile or clinical change, can switch to meropenem     ROS:   General: denies fever, chills, insomnia, depression, weight change  Skin: denies itch, jaundice   Resp: bilateral rhonchi  CV: denies PND  GI: denies N/V/D constipation   : denies d/c, itching, hematuria, dysuria   EXT: denies pain, swelling, erythema   Musculoskeletal: denies low back pain, joint pain, swelling   Neuro: denies headache, weakness, syncope    MEDICATIONS  (STANDING):  apixaban 2.5 milliGRAM(s) Oral every 12 hours  influenza  Vaccine (HIGH DOSE) 0.7 milliLiter(s) IntraMuscular once  metoprolol succinate ER 12.5 milliGRAM(s) Oral two times a day  neomycin/bacitracin/polymyxin Topical Ointment 1 Application(s) Topical daily  pantoprazole    Tablet 40 milliGRAM(s) Oral before breakfast    MEDICATIONS  (PRN):  acetaminophen     Tablet .. 325 milliGRAM(s) Oral every 4 hours PRN Temp greater or equal to 38C (100.4F)  haloperidol    Injectable 0.5 milliGRAM(s) IV Push every 6 hours PRN Agitation  HYDROmorphone  Injectable 0.5 milliGRAM(s) IV Push every 2 hours PRN Moderate pain (4-6), Severe pain (7-10), Respiratory rate greater than 22  LORazepam   Injectable 0.5 milliGRAM(s) IV Push every 4 hours PRN Anxiety    VITALS  T(F): 97.7 (02-18-23 @ 05:05), Max: 97.7 (02-18-23 @ 05:05)  HR: 56 (02-18-23 @ 07:47) (56 - 67)  BP: 138/71 (02-18-23 @ 05:05) (138/71 - 143/72)  RR: 18 (02-18-23 @ 07:47) (18 - 18)  SpO2: 94% (02-18-23 @ 07:47) (94% - 94%)    PHYSICAL EXAM  Gen- NAD, AAOx3  HEENT- no pallor, anicteric, moist mucous membranes  CVS- S1/S2, no m/r/g  Chest- CTA b/l no w/r/c, no use of accessory muscles, good inspiratory effort, speaking in full sentences  Abd- soft, nt, nd  Ext- no edema, pulses intact b/l  Neuro-CN II-XII intact;    LABS/IMAGING  02-17    140  |  110  |  49<H>  ----------------------------<  104<H>  5.1<H>   |  20  |  2.1<H>    Ca    9.5      17 Feb 2023 05:53  Mg     2.0     02-17    TPro  6.8  /  Alb  3.0<L>  /  TBili  0.5  /  DBili  x   /  AST  44<H>  /  ALT  40  /  AlkPhos  121<H>  02-17    LIVER FUNCTIONS - ( 17 Feb 2023 05:53 )  Alb: 3.0 g/dL / Pro: 6.8 g/dL / ALK PHOS: 121 U/L / ALT: 40 U/L / AST: 44 U/L / GGT: x                                 10.9   6.39  )-----------( 187      ( 17 Feb 2023 05:53 )             36.6     ABG - ( 17 Feb 2023 09:38 )  pH, Arterial: 7.35  pH, Blood: x     /  pCO2: 39    /  pO2: 65    / HCO3: 22    / Base Excess: -3.8  /  SaO2: 94.7      MICROBIOLOGY  Culture - Urine (collected 02-14-23 @ 00:00)  Source: Clean Catch Clean Catch (Midstream)  Preliminary Report (02-15-23 @ 22:34):    >100,000 CFU/ml Klebsiella pneumoniae  Organism: Klebsiella pneumoniae ESBL (02-16-23 @ 17:14)  Organism: Klebsiella pneumoniae ESBL (02-16-23 @ 17:14)      -  Amikacin: S <=16      -  Amoxicillin/Clavulanic Acid: S <=8/4 Consider reserving for cystitis when ampicillin/sulbactam is resistant      -  Ampicillin: R >16 These ampicillin results predict results for amoxicillin      -  Ampicillin/Sulbactam: R >16/8 Enterobacter, Klebsiella aerogenes, Citrobacter, and Serratia may develop resistance during prolonged therapy (3-4 days)      -  Aztreonam: R >16      -  Cefazolin: R >16 For uncomplicated UTI with K. pneumoniae, E. coli, or P. mirablis: JUSTIN <=16 is sensitive and JUSTIN >=32 is resistant. This also predicts results for oral agents cefaclor, cefdinir, cefpodoxime, cefprozil, cefuroxime axetil, cephalexin and locarbef for uncomplicated UTI. Note that some isolates may be susceptible to these agents while testing resistant to cefazolin.      -  Cefepime: R >16      -  Ceftriaxone: R >32 Enterobacter, Klebsiella aerogenes, Citrobacter, and Serratia may develop resistance during prolonged therapy      -  Cefuroxime: R >16      -  Ciprofloxacin: R >2      -  Ertapenem: S <=0.5      -  Gentamicin: S <=2      -  Imipenem: S <=1      -  Levofloxacin: R >4      -  Meropenem: S <=1      -  Nitrofurantoin: I 64 Should not be used to treat pyelonephritis      -  Piperacillin/Tazobactam: S <=8      -  Tobramycin: S <=2      -  Trimethoprim/Sulfamethoxazole: R >2/38      Method Type: JUSTIN    Culture - Blood (collected 02-13-23 @ 22:23)  Source: .Blood Blood-Peripheral  Preliminary Report (02-15-23 @ 18:02):    No growth to date.    Culture - Blood (collected 02-13-23 @ 22:23)  Source: .Blood Blood-Peripheral  Preliminary Report (02-15-23 @ 18:02):    No growth to date.    RADIOLOGY  < from: Xray Chest 1 View- PORTABLE-Urgent (Xray Chest 1 View- PORTABLE-Urgent .) (02.15.23 @ 14:35) >    Impression:    Bilateral opacities/effusions, unchanged    < end of copied text >

## 2023-02-18 NOTE — PROGRESS NOTE ADULT - SUBJECTIVE AND OBJECTIVE BOX
NEPHROLOGY FOLLOW UP NOTE    no new complaints      PAST MEDICAL & SURGICAL HISTORY:  CAD (coronary artery disease)      Bladder cancer      CVA (cerebral vascular accident)  2015      Afib      Presence of urostomy  2003      S/P CABG x 4        Iliac artery aneurysm        S/P ICD (internal cardiac defibrillator) procedure        Allergies:  No Known Allergies    Home Medications Reviewed    SOCIAL HISTORY:  Denies ETOH,Smoking,   FAMILY HISTORY:  Family history of early CAD          REVIEW OF SYSTEMS:  poorly obtainable   All other review of systems is negative unless indicated above.    PHYSICAL EXAM:  Constitutional: thin, frail  HEENT: anicteric sclera, oropharynx clear, MMM  Neck: No JVD  Respiratory: CTAB, no wheezes, rales or rhonchi  Cardiovascular: S1, S2, RRR  Gastrointestinal: BS+, soft, NT/ND  Extremities: No cyanosis or clubbing. No peripheral edema  Neurological: A/O x 3, no focal deficits  Psychiatric: Normal mood, normal affect  : No CVA tenderness. + urostomy  Skin: No rashes    Hospital Medications:   MEDICATIONS  (STANDING):  apixaban 2.5 milliGRAM(s) Oral every 12 hours  influenza  Vaccine (HIGH DOSE) 0.7 milliLiter(s) IntraMuscular once  metoprolol succinate ER 12.5 milliGRAM(s) Oral two times a day  neomycin/bacitracin/polymyxin Topical Ointment 1 Application(s) Topical daily  pantoprazole    Tablet 40 milliGRAM(s) Oral before breakfast        VITALS:  T(F): 96.5 (23 @ 13:42), Max: 97.7 (23 @ 05:05)  HR: 63 (23 @ 18:22)  BP: 117/71 (23 @ 18:22)  RR: 18 (23 @ 18:22)  SpO2: 98% (23 @ 18:22)  Wt(kg): --     @ 07:01  -   @ 07:00  --------------------------------------------------------  IN: 0 mL / OUT: 1050 mL / NET: -1050 mL     @ 07: @ 07:00  --------------------------------------------------------  IN: 0 mL / OUT: 350 mL / NET: -350 mL     @ 07:01   @ 18:35  --------------------------------------------------------  IN: 0 mL / OUT: 900 mL / NET: -900 mL          LABS:      140  |  110  |  49<H>  ----------------------------<  104<H>  5.1<H>   |  20  |  2.1<H>    Ca    9.5      2023 05:53  Mg     2.0         TPro  6.8  /  Alb  3.0<L>  /  TBili  0.5  /  DBili      /  AST  44<H>  /  ALT  40  /  AlkPhos  121<H>                            10.9   6.39  )-----------( 187      ( 2023 05:53 )             36.6       Urine Studies:  Urinalysis Basic - ( 2023 00:00 )    Color: Yellow / Appearance: Cloudy / S.020 / pH:   Gluc:  / Ketone: Negative  / Bili: Negative / Urobili: 1.0 mg/dL   Blood:  / Protein: 30 mg/dL / Nitrite: Negative   Leuk Esterase: Large / RBC: 3-5 /HPF / WBC >50 /HPF   Sq Epi:  / Non Sq Epi: Few /HPF / Bacteria: TNTC /HPF          RADIOLOGY & ADDITIONAL STUDIES:

## 2023-02-18 NOTE — PROGRESS NOTE ADULT - ASSESSMENT
DALE  CKD stage 3  PNA / ARF  aspiration / dysphagia  UTI, klebsiella, MDR  bladder ca s/p urostomy  head and neck Ca s/p XRT  wt loss  CAD / CABG / CHF / afib / aicd  CVA  DM2  PVD    plan:    comfort measures only   f/u palliative  f/u hospice  DNR / DNI  comfort feeds  off IVF  poor prognosis  d/w hospitalist

## 2023-02-18 NOTE — PROGRESS NOTE ADULT - ASSESSMENT
88-year-old male with past medical history of bladder cancer, throat cancer, CVA in 2015, CAD, CABG, a-fib on eliquis and with defibrillator presents with complaint of shortness of breath, found to have septic shock possibly in setting of severe dysphagia in setting of Throat cancer s/p radiation. Patient was directly admitted to MICU where he was on pressor support. Now, off pressor, patient is downgraded to medical floor.    #Sepsis (unclear etiology urine vs lungs)   #Acute hypoxic respiratory failure likely due to Aspiration PNA in setting of  severe dysphagia in setting of Throat cancer s/p radiation  #Complicated UTI   - continue IV antibiotics as per ID   - gram negative rods in urine   - follow cultures and procal  - Off levophed   - c/w metoprolol   - continue Eliquis  - Swallow recs appreciated  - ID: continue cefepime 1g q 12 hours until 2/20. if febrile or clinical change, can switch to meropenem. However, this has been discontinued due to CMO being initiated.    #Hx- UTI, CVA, afib, CAD s/p CABGF, CHF s/p ICD, DM2, PVD, CKD stage 3  #Throat cancer s/p radiation with severe dysphagia  #Hx of Bladder Cancer s/p urostomy  - Resume home meds  - Palliative and Hospice on the case    Spent over 35 min reviewing chart and on coordinating patient care during interdisciplinary rounds     Usman Deng M.D./Matthew  Attending Physician

## 2023-02-19 NOTE — PROGRESS NOTE ADULT - ASSESSMENT
DALE  CKD stage 3  PNA / ARF  aspiration / dysphagia  UTI, klebsiella, MDR  bladder ca s/p urostomy  head and neck Ca s/p XRT  wt loss  CAD / CABG / CHF / afib / aicd  CVA  DM2  PVD    plan:    comfort measures only   f/u palliative  f/u hospice  DNR / DNI  comfort feeds  off IVF  poor prognosis  d/w nursing

## 2023-02-19 NOTE — PROGRESS NOTE ADULT - SUBJECTIVE AND OBJECTIVE BOX
NEPHROLOGY FOLLOW UP NOTE    pt seen this AM  resting comfortably      PAST MEDICAL & SURGICAL HISTORY:  CAD (coronary artery disease)      Bladder cancer      CVA (cerebral vascular accident)        Afib      Presence of urostomy        S/P CABG x 4        Iliac artery aneurysm        S/P ICD (internal cardiac defibrillator) procedure        Allergies:  No Known Allergies    Home Medications Reviewed    SOCIAL HISTORY:  Denies ETOH,Smoking,   FAMILY HISTORY:  Family history of early CAD          REVIEW OF SYSTEMS:  poorly obtainable   All other review of systems is negative unless indicated above.    PHYSICAL EXAM:  Constitutional: thin, frail  HEENT: anicteric sclera, oropharynx clear, MMM  Neck: No JVD  Respiratory: CTAB, no wheezes, rales or rhonchi  Cardiovascular: S1, S2, RRR  Gastrointestinal: BS+, soft, NT/ND  Extremities: No cyanosis or clubbing. No peripheral edema  Neurological: lethargic  : No CVA tenderness. + urostomy  Skin: No rashes    Hospital Medications:   MEDICATIONS  (STANDING):  apixaban 2.5 milliGRAM(s) Oral every 12 hours  haloperidol    Injectable 0.5 milliGRAM(s) IV Push every 12 hours  influenza  Vaccine (HIGH DOSE) 0.7 milliLiter(s) IntraMuscular once  metoprolol succinate ER 12.5 milliGRAM(s) Oral two times a day  neomycin/bacitracin/polymyxin Topical Ointment 1 Application(s) Topical daily  pantoprazole    Tablet 40 milliGRAM(s) Oral before breakfast        VITALS:  T(F): 95.6 (23 @ 04:37), Max: 96.5 (23 @ 13:42)  HR: 57 (23 @ 04:37)  BP: 147/73 (23 @ 04:37)  RR: 18 (23 @ 18:22)  SpO2: 98% (23 @ 18:22)  Wt(kg): --     @ 07:01  -   @ 07:00  --------------------------------------------------------  IN: 0 mL / OUT: 350 mL / NET: -350 mL     @ 07:01  -  02-19 @ 07:00  --------------------------------------------------------  IN: 0 mL / OUT: 1200 mL / NET: -1200 mL          LABS:            Urine Studies:  Urinalysis Basic - ( 2023 00:00 )    Color: Yellow / Appearance: Cloudy / S.020 / pH:   Gluc:  / Ketone: Negative  / Bili: Negative / Urobili: 1.0 mg/dL   Blood:  / Protein: 30 mg/dL / Nitrite: Negative   Leuk Esterase: Large / RBC: 3-5 /HPF / WBC >50 /HPF   Sq Epi:  / Non Sq Epi: Few /HPF / Bacteria: TNTC /HPF          RADIOLOGY & ADDITIONAL STUDIES:

## 2023-02-19 NOTE — HOSPICE CARE NOTE - CONVESATION DETAILS
Hospice Intake Nurse has followed up with patients Family and grand- daughter and daughter  to discuss hospice services/ options. Patient has started on CMO is also being followed by Palliative team.     Family stated home is not option seeking facility. Family is aware at this time NO  [facility] available.  Family to review Nursing homes on [place] will   communicate with Unit  as well as hospice regarding placement. Hospice to remain available.

## 2023-02-19 NOTE — PROGRESS NOTE ADULT - SUBJECTIVE AND OBJECTIVE BOX
MEDICINE ATTENDING PROGRESS NOTE  88-year-old male with past medical history of bladder cancer, throat cancer, CVA in 2015, CAD, CABG, a-fib on eliquis and with defibrillator presents with complaint of shortness of breath, found to have septic shock possibly in setting of severe dysphagia in setting of Throat cancer s/p radiation. Patient was directly admitted to MICU where he was on pressor support. Now, off pressor, patient is downgraded to medical floor.    Interval/Overnight events:  02/19/23: Seen and examined at bedside. Pt looks better, feels better, and was eating with wife help. No complaints today.  Vitals stable. Less confuse on exam. Labs and Imaging reviewed. Cont current management. Await hospice placement. MOLTs form signed. Pt is still full code.    02/18/23: Seen and examined at bedside. Stable. No acute complaints. Now, CMO (comfort care only) per palliative. Pt appears confuses this AM. Vitals stable. Labs and Imaging reviewed. Cont current management. Await hospice placement. MOLTs form signed. Pt is still full code.    02/17/23: Seen and examined at bedside. Pt states he was aspirated last night after eating. Today, patient was tachypneic on exam. Vitals otherwise stable.  Will get ABG and CXR. Will place patient on oxygen. Pleasure feed as per patient and family. ID: continue cefepime 1g q 12 hours until 2/20. if febrile or clinical change, can switch to meropenem     ROS:   General: denies fever, chills, insomnia, depression, weight change  Skin: denies itch, jaundice   Resp: bilateral rhonchi  CV: denies PND  GI: denies N/V/D constipation   : denies d/c, itching, hematuria, dysuria   EXT: denies pain, swelling, erythema   Musculoskeletal: denies low back pain, joint pain, swelling   Neuro: denies headache, weakness, syncope    MEDICATIONS  (STANDING):  apixaban 2.5 milliGRAM(s) Oral every 12 hours  haloperidol    Injectable 0.5 milliGRAM(s) IV Push every 12 hours  influenza  Vaccine (HIGH DOSE) 0.7 milliLiter(s) IntraMuscular once  metoprolol succinate ER 12.5 milliGRAM(s) Oral two times a day  neomycin/bacitracin/polymyxin Topical Ointment 1 Application(s) Topical daily  pantoprazole    Tablet 40 milliGRAM(s) Oral before breakfast    MEDICATIONS  (PRN):  acetaminophen     Tablet .. 325 milliGRAM(s) Oral every 4 hours PRN Temp greater or equal to 38C (100.4F)  haloperidol    Injectable 0.5 milliGRAM(s) IV Push every 6 hours PRN Agitation  HYDROmorphone  Injectable 0.5 milliGRAM(s) IV Push every 2 hours PRN Moderate pain (4-6), Severe pain (7-10), Respiratory rate greater than 22  LORazepam   Injectable 0.5 milliGRAM(s) IV Push every 4 hours PRN Anxiety    VITALS  T(F): 95.6 (02-19-23 @ 04:37), Max: 96.5 (02-18-23 @ 13:42)  HR: 57 (02-19-23 @ 04:37) (57 - 63)  BP: 147/73 (02-19-23 @ 04:37) (117/71 - 147/73)  RR: 18 (02-18-23 @ 18:22) (18 - 18)  SpO2: 98% (02-18-23 @ 18:22) (98% - 98%)    PHYSICAL EXAM  Gen- NAD, AAOx3  HEENT- no pallor, anicteric, moist mucous membranes  CVS- S1/S2, no m/r/g  Chest- CTA b/l no w/r/c, no use of accessory muscles, good inspiratory effort, speaking in full sentences  Abd- soft, nt, nd  Ext- no edema, pulses intact b/l  Neuro-CN II-XII intact;    LABS/IMAGING  02-17    140  |  110  |  49<H>  ----------------------------<  104<H>  5.1<H>   |  20  |  2.1<H>    Ca    9.5      17 Feb 2023 05:53  Mg     2.0     02-17    TPro  6.8  /  Alb  3.0<L>  /  TBili  0.5  /  DBili  x   /  AST  44<H>  /  ALT  40  /  AlkPhos  121<H>  02-17    LIVER FUNCTIONS - ( 17 Feb 2023 05:53 )  Alb: 3.0 g/dL / Pro: 6.8 g/dL / ALK PHOS: 121 U/L / ALT: 40 U/L / AST: 44 U/L / GGT: x                                 10.9   6.39  )-----------( 187      ( 17 Feb 2023 05:53 )             36.6     ABG - ( 17 Feb 2023 09:38 )  pH, Arterial: 7.35  pH, Blood: x     /  pCO2: 39    /  pO2: 65    / HCO3: 22    / Base Excess: -3.8  /  SaO2: 94.7      MICROBIOLOGY  Culture - Urine (collected 02-14-23 @ 00:00)  Source: Clean Catch Clean Catch (Midstream)  Preliminary Report (02-15-23 @ 22:34):    >100,000 CFU/ml Klebsiella pneumoniae  Organism: Klebsiella pneumoniae ESBL (02-16-23 @ 17:14)  Organism: Klebsiella pneumoniae ESBL (02-16-23 @ 17:14)      -  Amikacin: S <=16      -  Amoxicillin/Clavulanic Acid: S <=8/4 Consider reserving for cystitis when ampicillin/sulbactam is resistant      -  Ampicillin: R >16 These ampicillin results predict results for amoxicillin      -  Ampicillin/Sulbactam: R >16/8 Enterobacter, Klebsiella aerogenes, Citrobacter, and Serratia may develop resistance during prolonged therapy (3-4 days)      -  Aztreonam: R >16      -  Cefazolin: R >16 For uncomplicated UTI with K. pneumoniae, E. coli, or P. mirablis: JUSTIN <=16 is sensitive and JUSTIN >=32 is resistant. This also predicts results for oral agents cefaclor, cefdinir, cefpodoxime, cefprozil, cefuroxime axetil, cephalexin and locarbef for uncomplicated UTI. Note that some isolates may be susceptible to these agents while testing resistant to cefazolin.      -  Cefepime: R >16      -  Ceftriaxone: R >32 Enterobacter, Klebsiella aerogenes, Citrobacter, and Serratia may develop resistance during prolonged therapy      -  Cefuroxime: R >16      -  Ciprofloxacin: R >2      -  Ertapenem: S <=0.5      -  Gentamicin: S <=2      -  Imipenem: S <=1      -  Levofloxacin: R >4      -  Meropenem: S <=1      -  Nitrofurantoin: I 64 Should not be used to treat pyelonephritis      -  Piperacillin/Tazobactam: S <=8      -  Tobramycin: S <=2      -  Trimethoprim/Sulfamethoxazole: R >2/38      Method Type: JUSTIN    Culture - Blood (collected 02-13-23 @ 22:23)  Source: .Blood Blood-Peripheral  Preliminary Report (02-15-23 @ 18:02):    No growth to date.    Culture - Blood (collected 02-13-23 @ 22:23)  Source: .Blood Blood-Peripheral  Preliminary Report (02-15-23 @ 18:02):    No growth to date.    RADIOLOGY  < from: Xray Chest 1 View- PORTABLE-Urgent (Xray Chest 1 View- PORTABLE-Urgent .) (02.15.23 @ 14:35) >    Impression:    Bilateral opacities/effusions, unchanged    < end of copied text >

## 2023-02-20 NOTE — PROGRESS NOTE ADULT - ASSESSMENT
DALE  CKD stage 3  PNA / ARF  aspiration / dysphagia  UTI, klebsiella, MDR  bladder ca s/p urostomy  head and neck Ca s/p XRT  wt loss  CAD / CABG / CHF / afib / aicd  CVA  DM2  PVD    plan:    comfort measures    f/u palliative  f/u hospice  DNR / DNI  comfort feeds / assist with meals  off IVF  poor prognosis  possible [facility]

## 2023-02-20 NOTE — CHART NOTE - NSCHARTNOTEFT_GEN_A_CORE
Phone call received from granddaughter of patient Mulu.   She reports the patient is much more lethargic and out of it today. She wanted to make sure this wasn't from the IV haldol. Assured her than since he has been on haldol since saturday night, and with the low dose that it is, this is very unlikely to be the cause. Explained that this is likely 2/2 him progressing closer to end of life. She also is hoping to get the patient out of a 3 person room because its confusing for the patient--> encouraged her to speak with charge nurse about this but she is aware all requests may not be able to be accommodated.     Recommend continuing medications as is for now- Haldol 0.5 mg IVP Q 12 H standing and Q 6 H PRN.   On d/c he may be changed to SL haldol.     Please call [phone] PRN.

## 2023-02-20 NOTE — PROGRESS NOTE ADULT - SUBJECTIVE AND OBJECTIVE BOX
NEPHROLOGY FOLLOW UP NOTE    pt seen this AM  poor historian  no new events      PAST MEDICAL & SURGICAL HISTORY:  CAD (coronary artery disease)      Bladder cancer      CVA (cerebral vascular accident)        Afib      Presence of urostomy        S/P CABG x 4        Iliac artery aneurysm        S/P ICD (internal cardiac defibrillator) procedure        Allergies:  No Known Allergies    Home Medications Reviewed    SOCIAL HISTORY:  Denies ETOH,Smoking,   FAMILY HISTORY:  Family history of early CAD          REVIEW OF SYSTEMS:  poorly obtainable   All other review of systems is negative unless indicated above.    PHYSICAL EXAM:  Constitutional: thin, frail  HEENT: anicteric sclera, oropharynx clear, MMM  Neck: No JVD  Respiratory: CTAB, no wheezes, rales or rhonchi  Cardiovascular: S1, S2, RRR  Gastrointestinal: BS+, soft, NT/ND  Extremities: No cyanosis or clubbing. No peripheral edema  Neurological: lethargic  : No CVA tenderness. + urostomy  Skin: No rashes    Hospital Medications:   MEDICATIONS  (STANDING):  apixaban 2.5 milliGRAM(s) Oral every 12 hours  haloperidol    Injectable 0.5 milliGRAM(s) IV Push every 12 hours  influenza  Vaccine (HIGH DOSE) 0.7 milliLiter(s) IntraMuscular once  metoprolol succinate ER 12.5 milliGRAM(s) Oral two times a day  neomycin/bacitracin/polymyxin Topical Ointment 1 Application(s) Topical daily  pantoprazole    Tablet 40 milliGRAM(s) Oral before breakfast        VITALS:  T(F): 95 (23 @ 14:08), Max: 95 (23 @ 14:08)  HR: 98 (23 @ 14:08)  BP: 129/74 (23 @ 14:08)  RR: 18 (23 @ 14:08)  SpO2: 98% (23 @ 08:23)  Wt(kg): --     @ 07:01  -   @ 07:00  --------------------------------------------------------  IN: 0 mL / OUT: 1200 mL / NET: -1200 mL     @ 07: @ 07:00  --------------------------------------------------------  IN: 0 mL / OUT: 950 mL / NET: -950 mL          LABS:            Urine Studies:  Urinalysis Basic - ( 2023 00:00 )    Color: Yellow / Appearance: Cloudy / S.020 / pH:   Gluc:  / Ketone: Negative  / Bili: Negative / Urobili: 1.0 mg/dL   Blood:  / Protein: 30 mg/dL / Nitrite: Negative   Leuk Esterase: Large / RBC: 3-5 /HPF / WBC >50 /HPF   Sq Epi:  / Non Sq Epi: Few /HPF / Bacteria: TNTC /HPF          RADIOLOGY & ADDITIONAL STUDIES:

## 2023-02-20 NOTE — PROGRESS NOTE ADULT - SUBJECTIVE AND OBJECTIVE BOX
MEDICINE ATTENDING PROGRESS NOTE  88-year-old male with past medical history of bladder cancer, throat cancer, CVA in 2015, CAD, CABG, a-fib on eliquis and with defibrillator presents with complaint of shortness of breath, found to have septic shock possibly in setting of severe dysphagia in setting of Throat cancer s/p radiation. Patient was directly admitted to MICU where he was on pressor support. Now, off pressor, patient is downgraded to medical floor.    Interval/Overnight events:  02/20/23: Seen and examined at bedside. No complaints. Vitals stable. Exam unchanged. Pending hospice placement    02/19/23: Seen and examined at bedside. Pt looks better, feels better, and was eating with wife help. No complaints today.  Vitals stable. Less confuse on exam. Labs and Imaging reviewed. Cont current management. Await hospice placement. MOLTs form signed. Pt is still full code.    02/18/23: Seen and examined at bedside. Stable. No acute complaints. Now, CMO (comfort care only) per palliative. Pt appears confuses this AM. Vitals stable. Labs and Imaging reviewed. Cont current management. Await hospice placement. MOLTs form signed. Pt is still full code.    02/17/23: Seen and examined at bedside. Pt states he was aspirated last night after eating. Today, patient was tachypneic on exam. Vitals otherwise stable.  Will get ABG and CXR. Will place patient on oxygen. Pleasure feed as per patient and family. ID: continue cefepime 1g q 12 hours until 2/20. if febrile or clinical change, can switch to meropenem     ROS:   General: denies fever, chills, insomnia, depression, weight change  Skin: denies itch, jaundice   Resp: bilateral rhonchi  CV: denies PND  GI: denies N/V/D constipation   : denies d/c, itching, hematuria, dysuria   EXT: denies pain, swelling, erythema   Musculoskeletal: denies low back pain, joint pain, swelling   Neuro: denies headache, weakness, syncope    MEDICATIONS  (STANDING):  apixaban 2.5 milliGRAM(s) Oral every 12 hours  haloperidol    Injectable 0.5 milliGRAM(s) IV Push every 12 hours  influenza  Vaccine (HIGH DOSE) 0.7 milliLiter(s) IntraMuscular once  metoprolol succinate ER 12.5 milliGRAM(s) Oral two times a day  neomycin/bacitracin/polymyxin Topical Ointment 1 Application(s) Topical daily  pantoprazole    Tablet 40 milliGRAM(s) Oral before breakfast    MEDICATIONS  (PRN):  acetaminophen     Tablet .. 325 milliGRAM(s) Oral every 4 hours PRN Temp greater or equal to 38C (100.4F)  haloperidol    Injectable 0.5 milliGRAM(s) IV Push every 6 hours PRN Agitation  HYDROmorphone  Injectable 0.5 milliGRAM(s) IV Push every 2 hours PRN Moderate pain (4-6), Severe pain (7-10), Respiratory rate greater than 22  LORazepam   Injectable 0.5 milliGRAM(s) IV Push every 4 hours PRN Anxiety    VITALS  T(F): 95 (02-19-23 @ 14:08), Max: 95 (02-19-23 @ 14:08)  HR: 98 (02-19-23 @ 14:08) (98 - 98)  BP: 129/74 (02-19-23 @ 14:08) (129/74 - 129/74)  RR: 18 (02-19-23 @ 14:08) (18 - 18)  SpO2: 98% (02-20-23 @ 08:23) (98% - 98%)    PHYSICAL EXAM  Gen- NAD, AAOx3  HEENT- no pallor, anicteric, moist mucous membranes  CVS- S1/S2, no m/r/g  Chest- CTA b/l no w/r/c, no use of accessory muscles, good inspiratory effort, speaking in full sentences  Abd- soft, nt, nd  Ext- no edema, pulses intact b/l  Neuro-CN II-XII intact;    LABS/IMAGING  02-17    140  |  110  |  49<H>  ----------------------------<  104<H>  5.1<H>   |  20  |  2.1<H>    Ca    9.5      17 Feb 2023 05:53  Mg     2.0     02-17    TPro  6.8  /  Alb  3.0<L>  /  TBili  0.5  /  DBili  x   /  AST  44<H>  /  ALT  40  /  AlkPhos  121<H>  02-17    LIVER FUNCTIONS - ( 17 Feb 2023 05:53 )  Alb: 3.0 g/dL / Pro: 6.8 g/dL / ALK PHOS: 121 U/L / ALT: 40 U/L / AST: 44 U/L / GGT: x                                 10.9   6.39  )-----------( 187      ( 17 Feb 2023 05:53 )             36.6     ABG - ( 17 Feb 2023 09:38 )  pH, Arterial: 7.35  pH, Blood: x     /  pCO2: 39    /  pO2: 65    / HCO3: 22    / Base Excess: -3.8  /  SaO2: 94.7      MICROBIOLOGY  Culture - Urine (collected 02-14-23 @ 00:00)  Source: Clean Catch Clean Catch (Midstream)  Preliminary Report (02-15-23 @ 22:34):    >100,000 CFU/ml Klebsiella pneumoniae  Organism: Klebsiella pneumoniae ESBL (02-16-23 @ 17:14)  Organism: Klebsiella pneumoniae ESBL (02-16-23 @ 17:14)      -  Amikacin: S <=16      -  Amoxicillin/Clavulanic Acid: S <=8/4 Consider reserving for cystitis when ampicillin/sulbactam is resistant      -  Ampicillin: R >16 These ampicillin results predict results for amoxicillin      -  Ampicillin/Sulbactam: R >16/8 Enterobacter, Klebsiella aerogenes, Citrobacter, and Serratia may develop resistance during prolonged therapy (3-4 days)      -  Aztreonam: R >16      -  Cefazolin: R >16 For uncomplicated UTI with K. pneumoniae, E. coli, or P. mirablis: JUSTIN <=16 is sensitive and JUSTIN >=32 is resistant. This also predicts results for oral agents cefaclor, cefdinir, cefpodoxime, cefprozil, cefuroxime axetil, cephalexin and locarbef for uncomplicated UTI. Note that some isolates may be susceptible to these agents while testing resistant to cefazolin.      -  Cefepime: R >16      -  Ceftriaxone: R >32 Enterobacter, Klebsiella aerogenes, Citrobacter, and Serratia may develop resistance during prolonged therapy      -  Cefuroxime: R >16      -  Ciprofloxacin: R >2      -  Ertapenem: S <=0.5      -  Gentamicin: S <=2      -  Imipenem: S <=1      -  Levofloxacin: R >4      -  Meropenem: S <=1      -  Nitrofurantoin: I 64 Should not be used to treat pyelonephritis      -  Piperacillin/Tazobactam: S <=8      -  Tobramycin: S <=2      -  Trimethoprim/Sulfamethoxazole: R >2/38      Method Type: JUSTIN    Culture - Blood (collected 02-13-23 @ 22:23)  Source: .Blood Blood-Peripheral  Preliminary Report (02-15-23 @ 18:02):    No growth to date.    Culture - Blood (collected 02-13-23 @ 22:23)  Source: .Blood Blood-Peripheral  Preliminary Report (02-15-23 @ 18:02):    No growth to date.    RADIOLOGY  < from: Xray Chest 1 View- PORTABLE-Urgent (Xray Chest 1 View- PORTABLE-Urgent .) (02.15.23 @ 14:35) >    Impression:    Bilateral opacities/effusions, unchanged    < end of copied text >

## 2023-02-21 NOTE — PROGRESS NOTE ADULT - SUBJECTIVE AND OBJECTIVE BOX
HPI:    · Chief Complaint: The patient is a 88y Male complaining of shortness of breath.  · HPI Objective Statement: 88-year-old male with past medical history of bladder cancer, throat cancer, CVA in 2015, CAD, CABG, a-fib on eliquis and with defibrillator presents with complaint of shortness of breath.  Patient's wife at bedside states shortness of breath began about 3 hours prior to arrival.  States patient has been altered since 6 PM.  Patient has chronic UTIs, has been on ciprofloxacin for the past 3 days (prescribed by nephrologist Dr. Contreras) for recent UTI. Denies fever/chills, chest pain, pleuritic chest pain, abdominal pain, n/v/d.   (14 Feb 2023 04:06)    Interval History  -Patient seen at bedside via telehealth  -He denied pain or other symptoms at time of visit  -Some mild agitation noted on exam    ADVANCE DIRECTIVES:    [ ] Full Code [x] DNR  MOLST  [ ]  Living Will  [ ]   DECISION MAKER(s):  [ ] Health Care Proxy(s)  [ x] Surrogate(s)  [ ] Guardian           Name(s): Phone Number(s): Wife adrian and granddaughter Dominga    BASELINE (I)ADL(s) (prior to admission):  [place]: [ ]Total  [ ] Moderate [ ]Dependent  Palliative Performance Status Version 2:         %    http://npcrc.org/files/news/palliative_performance_scale_ppsv2.pdf    Allergies  No Known Allergies    MEDICATIONS  (STANDING):  apixaban 2.5 milliGRAM(s) Oral every 12 hours  haloperidol    Injectable 0.5 milliGRAM(s) IV Push every 12 hours  influenza  Vaccine (HIGH DOSE) 0.7 milliLiter(s) IntraMuscular once  metoprolol succinate ER 12.5 milliGRAM(s) Oral two times a day  neomycin/bacitracin/polymyxin Topical Ointment 1 Application(s) Topical daily  pantoprazole    Tablet 40 milliGRAM(s) Oral before breakfast    MEDICATIONS  (PRN):  acetaminophen     Tablet .. 325 milliGRAM(s) Oral every 4 hours PRN Temp greater or equal to 38C (100.4F)  haloperidol    Injectable 0.5 milliGRAM(s) IV Push every 6 hours PRN Agitation  HYDROmorphone  Injectable 0.5 milliGRAM(s) IV Push every 2 hours PRN Moderate pain (4-6), Severe pain (7-10), Respiratory rate greater than 22  LORazepam   Injectable 0.5 milliGRAM(s) IV Push every 4 hours PRN Anxiety    PRESENT SYMPTOMS: [ ]Unable to obtain due to poor mentation   Source if other than patient:  [ ]Family   [ ]Team     Pain: [ ]yes [x]no  QOL impact -   Location -                    Aggravating factors -  Quality -  Radiation -  Timing-  Severity (0-10 scale):  Minimal acceptable level (0-10 scale):       Dyspnea:                           [ ]Mild [ ]Moderate [ ]Severe [x ]None  Anxiety:                             [ ]Mild [ ]Moderate [ ]Severe [x]None  Fatigue:                             [ ]Mild [ ]Moderate [ ]Severe [x]None  Nausea:                             [ ]Mild [ ]Moderate [ ]Severe [ xNone  Loss of appetite:              [ ]Mild [ ]Moderate [ ]Severe [x]None  Constipation:                    [ ]Mild [ ]Moderate [ ]Severe [x]None    Other Symptoms:  [ xAll other review of systems negative     Palliative Performance Status Version 2:         30   http://npcrc.org/files/news/palliative_performance_scale_ppsv2.pdf    PHYSICAL EXAM:  Vital Signs Last 24 Hrs  T(C): 35.2 (20 Feb 2023 20:35), Max: 35.2 (20 Feb 2023 20:35)  T(F): 95.3 (20 Feb 2023 20:35), Max: 95.3 (20 Feb 2023 20:35)  HR: 67 (20 Feb 2023 20:35) (67 - 67)  BP: 138/87 (20 Feb 2023 20:35) (138/87 - 138/87)  BP(mean): --  RR: 16 (20 Feb 2023 20:35) (16 - 16)  SpO2: --      GENERAL:  [x] No acute distress [ ]Lethargic  [ ]Unarousable  [x]Verbal  [ ]Non-Verbal [ ]Cachexia    BEHAVIORAL/PSYCH:  [ x] Alert and Oriented x2 Anxiety [ ] Delirium [x ] Agitation [ ] Calm   EYES: [x] No scleral icterus [ ] Scleral icterus [ ] Closed  ENMT:  [ ]Dry mouth  [ xNo external oral lesions [ ] No external ear or nose lesions  CARDIOVASCULAR:  [ ]Regular [ ]Irregular [ ]Tachy [x]Not Tachy  [ ]Edmundo [ ] Edema [ ] No edema  PULMONARY:  [ ]Tachypnea  [ ]Audible excessive secretions [ ] No labored breathing [ x] mild labored breathing  GASTROINTESTINAL: [ ]Soft  [ ]Distended  [x ]Not distended [ ]Non tender [ ]Tender  MUSCULOSKELETAL: [x ]No clubbing [ ] clubbing  [ ] No cyanosis [ ] cyanosis  NEUROLOGIC: [ ]No focal deficits  [x ]Follows commands  [ ]Does not follow commands  [ ]Cognitive impairment  [ ]Dysphagia  [ ]Dysarthria  [ ]Paresis   SKIN: [ ] Jaundiced [ x] Non-jaundiced [ ]Rash [ ]No Rash [ ] Warm [ ] Dry  MISC/LINES: [ ] ET tube [ ] Trach [ ]NGT/OGT [ ]PEG [ ]Goss      LABS: CMO - previous labs reviewed by me               RADIOLOGY & ADDITIONAL STUDIES: reviewed by me  < from: Xray Chest 1 View- PORTABLE-Urgent (Xray Chest 1 View- PORTABLE-Urgent .) (02.17.23 @ 10:02) >  Impression:    Increased bilateral opacities/effusions.    < end of copied text >      EKG: reviewed by me  < from: 12 Lead ECG (02.14.23 @ 08:41) >  Ventricular Rate 62 BPM    Atrial Rate 40 BPM    QRS Duration 118 ms    Q-T Interval 450 ms    QTC Calculation(Bazett) 456 ms    R Axis -30 degrees    T Axis 177 degrees    Diagnosis Line Atrial fibrillation  Left axis deviation  Non-specific intra-ventricular conduction delay    < end of copied text >      PROTEIN CALORIE MALNUTRITION PRESENT: [ ]mild [ ]moderate [ ]severe [ ]underweight [ ]morbid obesity  https://www.andeal.org/vault/2440/web/files/ONC/Table_Clinical%20Characteristics%20to%20Document%20Malnutrition-White%20JV%20et%20al%202012.pdf    Height (cm): 180.3 (02-16-23 @ 00:37)  Weight (kg): 63.7 (02-16-23 @ 00:37)  BMI (kg/m2): 19.6 (02-16-23 @ 00:37)    [ ]PPSV2 < or = to 30% [ ]significant weight loss  [ ]poor nutritional intake  [ ]anasarca      [ ]Artificial Nutrition      REFERRALS:   [x ]Chaplaincy  [ x]Hospice  [ ]Child Life  [x ]Social Work  [ ]Case management [ ]Holistic Therapy     Goals of Care Document:

## 2023-02-21 NOTE — HOSPICE CARE NOTE - CONVESATION DETAILS
Patient pending SNF placement with financial screen for Medicaid. Hospice available once D/C plan is in place.

## 2023-02-21 NOTE — CHART NOTE - NSCHARTNOTEFT_GEN_A_CORE
palliative team visited patient earlier today. patient was awake, alert, confused. spoke with patient's granddaughter - Kellen. reviewed role of palliative care SW. discussed she and her family are doing okay. support rendered. will follow. [phone]

## 2023-02-21 NOTE — PROGRESS NOTE ADULT - SUBJECTIVE AND OBJECTIVE BOX
NEPHROLOGY FOLLOW UP NOTE    pt seen this AM  lethargic, no new events      PAST MEDICAL & SURGICAL HISTORY:  CAD (coronary artery disease)      Bladder cancer      CVA (cerebral vascular accident)  2015      Afib      Presence of urostomy  2003      S/P CABG x 4  1991      Iliac artery aneurysm  2002      S/P ICD (internal cardiac defibrillator) procedure        Allergies:  No Known Allergies    Home Medications Reviewed    SOCIAL HISTORY:  Denies ETOH,Smoking,   FAMILY HISTORY:  Family history of early CAD          REVIEW OF SYSTEMS:  poorly obtainable   All other review of systems is negative unless indicated above.    PHYSICAL EXAM:  Constitutional: thin, frail  HEENT: anicteric sclera, oropharynx clear, MMM  Neck: No JVD  Respiratory: CTAB, no wheezes, rales or rhonchi  Cardiovascular: S1, S2, RRR  Gastrointestinal: BS+, soft, NT/ND  Extremities: No cyanosis or clubbing. No peripheral edema  Neurological: lethargic  : No CVA tenderness. + urostomy  Skin: No rashes    Hospital Medications:   MEDICATIONS  (STANDING):  apixaban 2.5 milliGRAM(s) Oral every 12 hours  haloperidol    Injectable 0.5 milliGRAM(s) IV Push every 12 hours  influenza  Vaccine (HIGH DOSE) 0.7 milliLiter(s) IntraMuscular once  metoprolol succinate ER 12.5 milliGRAM(s) Oral two times a day  neomycin/bacitracin/polymyxin Topical Ointment 1 Application(s) Topical daily  pantoprazole    Tablet 40 milliGRAM(s) Oral before breakfast        VITALS:  T(F): 95.3 (02-20-23 @ 20:35), Max: 95.3 (02-20-23 @ 20:35)  HR: 67 (02-20-23 @ 20:35)  BP: 138/87 (02-20-23 @ 20:35)  RR: 16 (02-20-23 @ 20:35)  SpO2: --  Wt(kg): --    02-19 @ 07:01  -  02-20 @ 07:00  --------------------------------------------------------  IN: 0 mL / OUT: 950 mL / NET: -950 mL    02-20 @ 07:01 - 02-21 @ 07:00  --------------------------------------------------------  IN: 200 mL / OUT: 700 mL / NET: -500 mL          LABS:            Urine Studies:        RADIOLOGY & ADDITIONAL STUDIES:

## 2023-02-21 NOTE — PROGRESS NOTE ADULT - PROBLEM SELECTOR PLAN 4
haloperidol    Injectable 0.5 milliGRAM(s) IV Push every 12 hours  haloperidol    Injectable 0.5 milliGRAM(s) IV Push every 6 hours PRN Agitation  LORazepam   Injectable 0.5 milliGRAM(s) IV Push every 4 hours PRN Anxiety

## 2023-02-21 NOTE — PROGRESS NOTE ADULT - SUBJECTIVE AND OBJECTIVE BOX
MEDICINE ATTENDING PROGRESS NOTE  88-year-old male with past medical history of bladder cancer, throat cancer, CVA in 2015, CAD, CABG, a-fib on eliquis and with defibrillator presents with complaint of shortness of breath, found to have septic shock possibly in setting of severe dysphagia in setting of Throat cancer s/p radiation. Patient was directly admitted to MICU where he was on pressor support. Now, off pressor, patient is downgraded to medical floor.    To do  [ ] Await hospice placement; CMO  [ ] on Haldo PRN for agitation/anxiety    Interval/Overnight events:  02/21/23: Seen and examined at bedside. No complaints. Vitals stable. Exam unchanged. Pending hospice placement    02/20/23: Seen and examined at bedside. No complaints. Vitals stable. Exam unchanged. Pending hospice placement    02/19/23: Seen and examined at bedside. Pt looks better, feels better, and was eating with wife help. No complaints today.  Vitals stable. Less confuse on exam. Labs and Imaging reviewed. Cont current management. Await hospice placement. MOLTs form signed. Pt is still full code.    02/18/23: Seen and examined at bedside. Stable. No acute complaints. Now, CMO (comfort care only) per palliative. Pt appears confuses this AM. Vitals stable. Labs and Imaging reviewed. Cont current management. Await hospice placement. MOLTs form signed. Pt is still full code.    02/17/23: Seen and examined at bedside. Pt states he was aspirated last night after eating. Today, patient was tachypneic on exam. Vitals otherwise stable.  Will get ABG and CXR. Will place patient on oxygen. Pleasure feed as per patient and family. ID: continue cefepime 1g q 12 hours until 2/20. if febrile or clinical change, can switch to meropenem     ROS:   General: denies fever, chills, insomnia, depression, weight change  Skin: denies itch, jaundice   Resp: bilateral rhonchi  CV: denies PND  GI: denies N/V/D constipation   : denies d/c, itching, hematuria, dysuria   EXT: denies pain, swelling, erythema   Musculoskeletal: denies low back pain, joint pain, swelling   Neuro: denies headache, weakness, syncope    MEDICATIONS  (STANDING):  apixaban 2.5 milliGRAM(s) Oral every 12 hours  haloperidol    Injectable 0.5 milliGRAM(s) IV Push every 12 hours  influenza  Vaccine (HIGH DOSE) 0.7 milliLiter(s) IntraMuscular once  metoprolol succinate ER 12.5 milliGRAM(s) Oral two times a day  neomycin/bacitracin/polymyxin Topical Ointment 1 Application(s) Topical daily  pantoprazole    Tablet 40 milliGRAM(s) Oral before breakfast    MEDICATIONS  (PRN):  acetaminophen     Tablet .. 325 milliGRAM(s) Oral every 4 hours PRN Temp greater or equal to 38C (100.4F)  haloperidol    Injectable 0.5 milliGRAM(s) IV Push every 6 hours PRN Agitation  HYDROmorphone  Injectable 0.5 milliGRAM(s) IV Push every 2 hours PRN Moderate pain (4-6), Severe pain (7-10), Respiratory rate greater than 22  LORazepam   Injectable 0.5 milliGRAM(s) IV Push every 4 hours PRN Anxiety    VITALS  T(F): 95.3 (02-20-23 @ 20:35), Max: 95.3 (02-20-23 @ 20:35)  HR: 67 (02-20-23 @ 20:35) (67 - 67)  BP: 138/87 (02-20-23 @ 20:35) (138/87 - 138/87)  RR: 16 (02-20-23 @ 20:35) (16 - 16)  SpO2: --    PHYSICAL EXAM  Gen- NAD, AAOx3  HEENT- no pallor, anicteric, moist mucous membranes  CVS- S1/S2, no m/r/g  Chest- CTA b/l no w/r/c, no use of accessory muscles, good inspiratory effort, speaking in full sentences  Abd- soft, nt, nd  Ext- no edema, pulses intact b/l  Neuro-CN II-XII intact;    LABS/IMAGING  02-17    140  |  110  |  49<H>  ----------------------------<  104<H>  5.1<H>   |  20  |  2.1<H>    Ca    9.5      17 Feb 2023 05:53  Mg     2.0     02-17    TPro  6.8  /  Alb  3.0<L>  /  TBili  0.5  /  DBili  x   /  AST  44<H>  /  ALT  40  /  AlkPhos  121<H>  02-17    LIVER FUNCTIONS - ( 17 Feb 2023 05:53 )  Alb: 3.0 g/dL / Pro: 6.8 g/dL / ALK PHOS: 121 U/L / ALT: 40 U/L / AST: 44 U/L / GGT: x                                 10.9   6.39  )-----------( 187      ( 17 Feb 2023 05:53 )             36.6     ABG - ( 17 Feb 2023 09:38 )  pH, Arterial: 7.35  pH, Blood: x     /  pCO2: 39    /  pO2: 65    / HCO3: 22    / Base Excess: -3.8  /  SaO2: 94.7      MICROBIOLOGY  Culture - Urine (collected 02-14-23 @ 00:00)  Source: Clean Catch Clean Catch (Midstream)  Preliminary Report (02-15-23 @ 22:34):    >100,000 CFU/ml Klebsiella pneumoniae  Organism: Klebsiella pneumoniae ESBL (02-16-23 @ 17:14)  Organism: Klebsiella pneumoniae ESBL (02-16-23 @ 17:14)      -  Amikacin: S <=16      -  Amoxicillin/Clavulanic Acid: S <=8/4 Consider reserving for cystitis when ampicillin/sulbactam is resistant      -  Ampicillin: R >16 These ampicillin results predict results for amoxicillin      -  Ampicillin/Sulbactam: R >16/8 Enterobacter, Klebsiella aerogenes, Citrobacter, and Serratia may develop resistance during prolonged therapy (3-4 days)      -  Aztreonam: R >16      -  Cefazolin: R >16 For uncomplicated UTI with K. pneumoniae, E. coli, or P. mirablis: JUSTIN <=16 is sensitive and JUSTIN >=32 is resistant. This also predicts results for oral agents cefaclor, cefdinir, cefpodoxime, cefprozil, cefuroxime axetil, cephalexin and locarbef for uncomplicated UTI. Note that some isolates may be susceptible to these agents while testing resistant to cefazolin.      -  Cefepime: R >16      -  Ceftriaxone: R >32 Enterobacter, Klebsiella aerogenes, Citrobacter, and Serratia may develop resistance during prolonged therapy      -  Cefuroxime: R >16      -  Ciprofloxacin: R >2      -  Ertapenem: S <=0.5      -  Gentamicin: S <=2      -  Imipenem: S <=1      -  Levofloxacin: R >4      -  Meropenem: S <=1      -  Nitrofurantoin: I 64 Should not be used to treat pyelonephritis      -  Piperacillin/Tazobactam: S <=8      -  Tobramycin: S <=2      -  Trimethoprim/Sulfamethoxazole: R >2/38      Method Type: JUSTIN    Culture - Blood (collected 02-13-23 @ 22:23)  Source: .Blood Blood-Peripheral  Preliminary Report (02-15-23 @ 18:02):    No growth to date.    Culture - Blood (collected 02-13-23 @ 22:23)  Source: .Blood Blood-Peripheral  Preliminary Report (02-15-23 @ 18:02):    No growth to date.    RADIOLOGY  < from: Xray Chest 1 View- PORTABLE-Urgent (Xray Chest 1 View- PORTABLE-Urgent .) (02.15.23 @ 14:35) >    Impression:    Bilateral opacities/effusions, unchanged    < end of copied text >

## 2023-02-21 NOTE — PROGRESS NOTE ADULT - ASSESSMENT
88 year old man with history of bladder cancer, throat cancer, CVA presents with SOB. Hospital course complicated by sepsis, aspiration in the setting of ongoing dysphagia, complicated UTI.  Palliative care consulted for GOC.     Patient seen at bedside via telehealth.  He denied pain. Continue current symptom management.       See Recs below.    Please call [phone] with questions or concerns 24/7.   We will continue to follow.     Discussed with primary MD.

## 2023-02-21 NOTE — PROGRESS NOTE ADULT - PROBLEM SELECTOR PLAN 3
In the history of previous CVA  -comfort measures only  -no PEG or artificial nutrition  -comfort feeds OK - aspiration risk known by patient/family.

## 2023-02-21 NOTE — PROGRESS NOTE ADULT - ASSESSMENT
[facility]ALE  CKD stage 3  PNA / ARF  aspiration / dysphagia  UTI, klebsiella, MDR  bladder ca s/p urostomy  head and neck Ca s/p XRT  wt loss  CAD / CABG / CHF / afib / aicd  CVA  DM2  PVD    plan:    comfort measures    f/u palliative  f/u hospice  DNR / DNI  comfort feeds / assist with meals  off IVF  poor prognosis  possible [facility]   d/w hospitalist

## 2023-02-21 NOTE — PROGRESS NOTE ADULT - PROBLEM SELECTOR PLAN 5
HYDROmorphone  Injectable 0.5 milliGRAM(s) IV Push every 2 hours PRN Moderate pain (4-6), Severe pain (7-10), Respiratory rate greater than 22

## 2023-02-21 NOTE — PROGRESS NOTE ADULT - PROBLEM SELECTOR PLAN 1
-DNR/DNI  -Comfort measures only  -No additional IVF, artificial nutrition, blood draws, vital signs, pressors, antibiotics, escalation of oxygen, escalation of care  -Comfort feeds OK  -OK to continue home meds if patient tolerates PO  -Hospice consulted - awaiting dispo

## 2023-02-22 NOTE — PROGRESS NOTE ADULT - SUBJECTIVE AND OBJECTIVE BOX
NEPHROLOGY FOLLOW UP NOTE    pt seen and examined      PAST MEDICAL & SURGICAL HISTORY:  CAD (coronary artery disease)      Bladder cancer      CVA (cerebral vascular accident)  2015      Afib      Presence of urostomy  2003      S/P CABG x 4  1991      Iliac artery aneurysm  2002      S/P ICD (internal cardiac defibrillator) procedure        Allergies:  No Known Allergies    Home Medications Reviewed    SOCIAL HISTORY:  Denies ETOH,Smoking,   FAMILY HISTORY:  Family history of early CAD          REVIEW OF SYSTEMS:  poorly obtainable   All other review of systems is negative unless indicated above.    PHYSICAL EXAM:  Constitutional: thin, frail  HEENT: anicteric sclera, oropharynx clear, MMM  Neck: No JVD  Respiratory: CTAB, no wheezes, rales or rhonchi  Cardiovascular: S1, S2, RRR  Gastrointestinal: BS+, soft, NT/ND  Extremities: No cyanosis or clubbing. No peripheral edema  Neurological: lethargic  : No CVA tenderness. + urostomy  Skin: No rashes    Hospital Medications:   MEDICATIONS  (STANDING):  apixaban 2.5 milliGRAM(s) Oral every 12 hours  haloperidol    Injectable 0.5 milliGRAM(s) IV Push every 12 hours  influenza  Vaccine (HIGH DOSE) 0.7 milliLiter(s) IntraMuscular once  metoprolol succinate ER 12.5 milliGRAM(s) Oral two times a day  neomycin/bacitracin/polymyxin Topical Ointment 1 Application(s) Topical daily  pantoprazole    Tablet 40 milliGRAM(s) Oral before breakfast        VITALS:  T(F): 94.5 (02-22-23 @ 05:08), Max: 94.5 (02-22-23 @ 05:08)  HR: 66 (02-22-23 @ 05:08)  BP: 133/66 (02-22-23 @ 05:08)  RR: --  SpO2: 100% (02-22-23 @ 05:08)  Wt(kg): --    02-20 @ 07:01  -  02-21 @ 07:00  --------------------------------------------------------  IN: 200 mL / OUT: 700 mL / NET: -500 mL          LABS:            Urine Studies:        RADIOLOGY & ADDITIONAL STUDIES:

## 2023-02-22 NOTE — PROGRESS NOTE ADULT - SUBJECTIVE AND OBJECTIVE BOX
Progress note    Patient seen and examined at bedside. Patient is CMO. He does not appear in any distress.    INTERVAL HPI/OVERNIGHT EVENTS:    REVIEW OF SYSTEMS:  All other 13 Review of systems were reviewed and are negative    FAMILY HISTORY:  Family history of early CAD      T(C): 34.7 (02-22-23 @ 05:08), Max: 34.7 (02-22-23 @ 05:08)  HR: 66 (02-22-23 @ 05:08) (66 - 66)  BP: 133/66 (02-22-23 @ 05:08) (133/66 - 133/66)  RR: --  SpO2: 100% (02-22-23 @ 05:08) (100% - 100%)  Wt(kg): --Vital Signs Last 24 Hrs  T(C): 34.7 (22 Feb 2023 05:08), Max: 34.7 (22 Feb 2023 05:08)  T(F): 94.5 (22 Feb 2023 05:08), Max: 94.5 (22 Feb 2023 05:08)  HR: 66 (22 Feb 2023 05:08) (66 - 66)  BP: 133/66 (22 Feb 2023 05:08) (133/66 - 133/66)  BP(mean): --  RR: --  SpO2: 100% (22 Feb 2023 05:08) (100% - 100%)      No Known Allergies      PHYSICAL EXAM:  GENERAL: NAD, well-groomed, well-developed  HEAD:  Atraumatic, Normocephalic  EYES: EOMI, PERRLA, conjunctiva and sclera clear  ENMT: No tonsillar erythema, exudates, or enlargement; Moist mucous membranes, Good dentition, No lesions  NECK: Supple, No JVD, Normal thyroid  NERVOUS SYSTEM:  Alert & Oriented X3, Good concentration; Motor Strength 5/5 B/L upper and lower extremities; DTRs 2+ intact and symmetric  CHEST/LUNG: Clear to percussion bilaterally; No rales, rhonchi, wheezing, or rubs  HEART: Regular rate and rhythm; No murmurs, rubs, or gallops  ABDOMEN: Soft, Nontender, Nondistended; Bowel sounds present  EXTREMITIES:  2+ Peripheral Pulses, No clubbing, cyanosis, or edema  LYMPH: No lymphadenopathy noted  SKIN: No rashes or lesions    Consultant(s) Notes Reviewed:  [x ] YES  [ ] NO  Care Discussed with Consultants/Other Providers [ x] YES  [ ] NO    LABS:      RADIOLOGY & ADDITIONAL TESTS:    Imaging Personally Reviewed:  [ ] YES  [ ] NO  acetaminophen     Tablet .. 325 milliGRAM(s) Oral every 4 hours PRN  apixaban 2.5 milliGRAM(s) Oral every 12 hours  haloperidol    Injectable 0.5 milliGRAM(s) IV Push every 6 hours PRN  haloperidol    Injectable 0.5 milliGRAM(s) IV Push every 12 hours  HYDROmorphone  Injectable 0.5 milliGRAM(s) IV Push every 2 hours PRN  influenza  Vaccine (HIGH DOSE) 0.7 milliLiter(s) IntraMuscular once  LORazepam   Injectable 0.5 milliGRAM(s) IV Push every 4 hours PRN  metoprolol succinate ER 12.5 milliGRAM(s) Oral two times a day  neomycin/bacitracin/polymyxin Topical Ointment 1 Application(s) Topical daily  pantoprazole    Tablet 40 milliGRAM(s) Oral before breakfast      HEALTH ISSUES - PROBLEM Dx:  Sepsis    Dysphagia    Palliative care by specialist    Agitation    Dyspnea    88-year-old male with past medical history of bladder cancer, throat cancer, CVA in 2015, CAD, CABG, a-fib on eliquis and with defibrillator presents with complaint of shortness of breath, found to have septic shock possibly in setting of severe dysphagia in setting of Throat cancer s/p radiation. Patient was directly admitted to MICU where he was on pressor support. Now, off pressor, patient is downgraded to medical floor.    #Sepsis (unclear etiology urine vs lungs)   #Acute hypoxic respiratory failure likely due to Aspiration PNA in setting of  severe dysphagia in setting of Throat cancer s/p radiation  #Complicated UTI   - continue IV antibiotics as per ID   - gram negative rods in urine   - follow cultures and procal  - Off levophed   - c/w metoprolol   - continue Eliquis  - Swallow recs appreciated  - ID: continue cefepime 1g q 12 hours until 2/20. if febrile or clinical change, can switch to meropenem. However, this has been discontinued due to CMO being initiated.    #Hx- UTI, CVA, afib, CAD s/p CABGF, CHF s/p ICD, DM2, PVD, CKD stage 3  #Throat cancer s/p radiation with severe dysphagia  #Hx of Bladder Cancer s/p urostomy  - Resume home meds  - Palliative and Hospice on the case    Spent over 25 min reviewing chart and on coordinating patient care during interdisciplinary rounds     Dispo: pending placement      Total time spent to complete patient's bedside assessment, review medical chart, discuss medical plan of care with covering medical team was ___25_____ with > 50% of time spent face to face w/ patient, discussion with patient/family and/or coordination of care

## 2023-02-22 NOTE — CHART NOTE - NSCHARTNOTEFT_GEN_A_CORE
PALLIATIVE MEDICINE INTERDISCIPLINARY TEAM NOTE    Provider:  [   ]Social Work   [   ]          [   ] Initial visit [   ] Follow up    Family or contact name / phone #   Met with: [   ] Patient  [   ] Family  [   ] Other:    Primary Language: [   ] English [   ] Other*:                      *Interpretation provided by:    SUPPORT DIAGNOSES            (Check all that apply)  [   ] Psychosocial spiritual assessment (PSSA)  [   ] EOL issues  [   ] Cultural / spiritual concerns  [   ] Pain / suffering  [   ] Dementia / AMS  [   ] Other:  [   ] AD issues  [   ] Grief / loss / sadness  [   ] Discharge issues  [   ] Distress / coping    PSYCHOSOCIAL ASSESSMENT OF PATIENT         (Check all that apply)  [   ] Initial Assessment            [   ] Reassessment          [   ] Not Applicable this visit    Pain/suffering acuity:  [   ] None to mild (0-3)           [   ] Moderate (4-6)        [   ] High (7-10)    Mental Status:  [   ] Alert/oriented (x3)          [   ] Confused/Altered(x2/x1)         [   ] Non-resp    Functional status:  [   ] Independent w ADLs      [   ] Needs Assistance             [   ] Bedbound/Full Care    Coping:  [   ] Coping well                     [   ] Coping w/difficulty            [   ] Poor coping    Support system:  [   ] Strong                              [   ] Adequate                        [   ] Inadequate    SPIRITUAL ASSESSMENT  Anglican/Spiritual practice: ____Catholic_______________________    Role of organized Mandaeism:  [   ] Important                     [   ] Some (fam tradition, cultural)               [ x  ] None    Effects on medical care:  [   ] Yes, _____________________________________                         [ x  ] None    Cultural/Presybeterian need:  [   ] Yes, _____________________________________                         [ x  ] None    Refer to Pastoral Care:  [   ] Yes           [ x  ] No, not at this time    SERVICE PROVIDED  [   ]PSSA                                                                             [   ]Discharge support / facilitation  [   ]AD / goals of care counseling                                  [   ]EOL / death / bereavement counseling  [ x  ]Counseling / support                                                [   ] Family meeting  [ x  ]Prayer / sacrament / ritual                                      [   ] Referral   [   ]Other                                                                       NOTE and Plan of Care (PoC): Pt seemed to be comfortable. PT'S SPOUSE AND HIS NEPHEW WERE AT BEDSIDE AT TIME OF VISIT. I was a supportive, comforting presence. I will follow up as needed.

## 2023-02-22 NOTE — PROGRESS NOTE ADULT - ASSESSMENT
DALE  CKD stage 3  PNA / ARF  aspiration / dysphagia  UTI, klebsiella, MDR  bladder ca s/p urostomy  head and neck Ca s/p XRT  wt loss  CAD / CABG / CHF / afib / aicd  CVA  DM2  PVD    plan:    comfort measures    f/u palliative  f/u hospice  DNR / DNI  comfort feeds / assist with meals  off IVF  poor prognosis  dc plannign to hospice at SNF   d/w wife and other family members at bedside

## 2023-02-23 NOTE — HOSPICE CARE NOTE - CONVESATION DETAILS
Family has declined placement in several nursing homes. Hospice available once D/C plan is in place.

## 2023-02-23 NOTE — HOSPICE CARE NOTE - CONVESATION DETAILS
Call received from patient's grand daughter Madison reporting that the SNF's offered were unacceptable and they have no D/C plan at this time. Revisited home hospice services which Gustavo verbalizes is not an option. Reinforced that family should strongly consider a plan for home hospice services as they seem dissatisfied with options in [facility]. Reinforced that family can seek private hire [facility] and coordinate a schedule among family members in an effort to have a back up plan for home hospice should the need arise. Hospice contact information provided.

## 2023-02-23 NOTE — PROGRESS NOTE ADULT - SUBJECTIVE AND OBJECTIVE BOX
Progress note    Patient seen and examined at bedside. He is on 1L oxygen in no acute distress.    INTERVAL HPI/OVERNIGHT EVENTS:    REVIEW OF SYSTEMS:  All other 13 Review of systems were reviewed and are negative    FAMILY HISTORY:  Family history of early CAD      T(C): 35.8 (02-23-23 @ 06:00), Max: 35.8 (02-23-23 @ 06:00)  HR: 81 (02-23-23 @ 06:00) (66 - 81)  BP: 138/61 (02-23-23 @ 06:00) (131/58 - 138/61)  RR: --  SpO2: --  Wt(kg): --Vital Signs Last 24 Hrs  T(C): 35.8 (23 Feb 2023 06:00), Max: 35.8 (23 Feb 2023 06:00)  T(F): 96.4 (23 Feb 2023 06:00), Max: 96.4 (23 Feb 2023 06:00)  HR: 81 (23 Feb 2023 06:00) (66 - 81)  BP: 138/61 (23 Feb 2023 06:00) (131/58 - 138/61)  BP(mean): --  RR: --  SpO2: --      No Known Allergies      PHYSICAL EXAM:  GENERAL: NAD, well-groomed, well-developed  HEAD:  Atraumatic, Normocephalic  EYES: EOMI, PERRLA, conjunctiva and sclera clear  ENMT: No tonsillar erythema, exudates, or enlargement; Moist mucous membranes, Good dentition, No lesions  NECK: Supple, No JVD, Normal thyroid  NERVOUS SYSTEM:  Alert & Oriented X3, Good concentration; Motor Strength 5/5 B/L upper and lower extremities; DTRs 2+ intact and symmetric  CHEST/LUNG: Clear to percussion bilaterally; No rales, rhonchi, wheezing, or rubs  HEART: Regular rate and rhythm; No murmurs, rubs, or gallops  ABDOMEN: Soft, Nontender, Nondistended; Bowel sounds present  EXTREMITIES:  2+ Peripheral Pulses, No clubbing, cyanosis, or edema  LYMPH: No lymphadenopathy noted  SKIN: No rashes or lesions    Consultant(s) Notes Reviewed:  [x ] YES  [ ] NO  Care Discussed with Consultants/Other Providers [ x] YES  [ ] NO    LABS:      RADIOLOGY & ADDITIONAL TESTS:    Imaging Personally Reviewed:  [ ] YES  [ ] NO  acetaminophen     Tablet .. 325 milliGRAM(s) Oral every 4 hours PRN  apixaban 2.5 milliGRAM(s) Oral every 12 hours  haloperidol    Injectable 0.5 milliGRAM(s) IV Push every 6 hours PRN  haloperidol    Injectable 0.5 milliGRAM(s) IV Push every 12 hours  HYDROmorphone  Injectable 0.5 milliGRAM(s) IV Push every 2 hours PRN  influenza  Vaccine (HIGH DOSE) 0.7 milliLiter(s) IntraMuscular once  LORazepam   Injectable 0.5 milliGRAM(s) IV Push every 4 hours PRN  metoprolol succinate ER 12.5 milliGRAM(s) Oral two times a day  neomycin/bacitracin/polymyxin Topical Ointment 1 Application(s) Topical daily  pantoprazole    Tablet 40 milliGRAM(s) Oral before breakfast      HEALTH ISSUES - PROBLEM Dx:  Sepsis    Dysphagia    Palliative care by specialist    Agitation    Dyspnea    88-year-old male with past medical history of bladder cancer, throat cancer, CVA in 2015, CAD, CABG, a-fib on eliquis and with defibrillator presents with complaint of shortness of breath, found to have septic shock possibly in setting of severe dysphagia in setting of Throat cancer s/p radiation. Patient was directly admitted to MICU where he was on pressor support. Now, off pressor, patient is downgraded to medical floor.    #Sepsis (unclear etiology urine vs lungs)   #Acute hypoxic respiratory failure likely due to Aspiration PNA in setting of  severe dysphagia in setting of Throat cancer s/p radiation  #Complicated UTI  #Hx- UTI, CVA, afib, CAD s/p CABGF, CHF s/p ICD, DM2, PVD, CKD stage 3  #Throat cancer s/p radiation with severe dysphagia  #Hx of Bladder Cancer s/p urostomy    I had a long conversation with Judy Hardwick regarding dispo planning. We then spoke again later in the afternoon regarding current goals of care. We will be stopping eliquis and metoprolol. We have agreed that POC glucose checking will not provide us any additional help and does not help with CMO protocol. Family is amenable to proceeding with [facility] for hospice care. Spoke with CM to relay message, CM will reach out to family to discuss further plans    Spent over 55 min reviewing chart and on coordinating patient care during interdisciplinary rounds     Dispo: pending placement Progress note    Patient seen and examined at bedside. He is on 1L oxygen in no acute distress.    INTERVAL HPI/OVERNIGHT EVENTS:    REVIEW OF SYSTEMS:  All other 13 Review of systems were reviewed and are negative    FAMILY HISTORY:  Family history of early CAD      T(C): 35.8 (02-23-23 @ 06:00), Max: 35.8 (02-23-23 @ 06:00)  HR: 81 (02-23-23 @ 06:00) (66 - 81)  BP: 138/61 (02-23-23 @ 06:00) (131/58 - 138/61)  RR: --  SpO2: --  Wt(kg): --Vital Signs Last 24 Hrs  T(C): 35.8 (23 Feb 2023 06:00), Max: 35.8 (23 Feb 2023 06:00)  T(F): 96.4 (23 Feb 2023 06:00), Max: 96.4 (23 Feb 2023 06:00)  HR: 81 (23 Feb 2023 06:00) (66 - 81)  BP: 138/61 (23 Feb 2023 06:00) (131/58 - 138/61)  BP(mean): --  RR: --  SpO2: --      No Known Allergies      PHYSICAL EXAM:  GENERAL: NAD, well-groomed, well-developed  HEAD:  Atraumatic, Normocephalic  EYES: EOMI, PERRLA, conjunctiva and sclera clear  ENMT: No tonsillar erythema, exudates, or enlargement; Moist mucous membranes, Good dentition, No lesions  NECK: Supple, No JVD, Normal thyroid  NERVOUS SYSTEM:  Alert & Oriented X3, Good concentration; Motor Strength 5/5 B/L upper and lower extremities; DTRs 2+ intact and symmetric  CHEST/LUNG: Clear to percussion bilaterally; No rales, rhonchi, wheezing, or rubs  HEART: Regular rate and rhythm; No murmurs, rubs, or gallops  ABDOMEN: Soft, Nontender, Nondistended; Bowel sounds present  EXTREMITIES:  2+ Peripheral Pulses, No clubbing, cyanosis, or edema  LYMPH: No lymphadenopathy noted  SKIN: No rashes or lesions    Consultant(s) Notes Reviewed:  [x ] YES  [ ] NO  Care Discussed with Consultants/Other Providers [ x] YES  [ ] NO    LABS:      RADIOLOGY & ADDITIONAL TESTS:    Imaging Personally Reviewed:  [ ] YES  [ ] NO  acetaminophen     Tablet .. 325 milliGRAM(s) Oral every 4 hours PRN  apixaban 2.5 milliGRAM(s) Oral every 12 hours  haloperidol    Injectable 0.5 milliGRAM(s) IV Push every 6 hours PRN  haloperidol    Injectable 0.5 milliGRAM(s) IV Push every 12 hours  HYDROmorphone  Injectable 0.5 milliGRAM(s) IV Push every 2 hours PRN  influenza  Vaccine (HIGH DOSE) 0.7 milliLiter(s) IntraMuscular once  LORazepam   Injectable 0.5 milliGRAM(s) IV Push every 4 hours PRN  metoprolol succinate ER 12.5 milliGRAM(s) Oral two times a day  neomycin/bacitracin/polymyxin Topical Ointment 1 Application(s) Topical daily  pantoprazole    Tablet 40 milliGRAM(s) Oral before breakfast      HEALTH ISSUES - PROBLEM Dx:  Sepsis    Dysphagia    Palliative care by specialist    Agitation    Dyspnea    88-year-old male with past medical history of bladder cancer, throat cancer, CVA in 2015, CAD, CABG, a-fib on eliquis and with defibrillator presents with complaint of shortness of breath, found to have septic shock possibly in setting of severe dysphagia in setting of Throat cancer s/p radiation. Patient was directly admitted to MICU where he was on pressor support. Now, off pressor, patient is downgraded to medical floor.    #Sepsis (unclear etiology urine vs lungs)   #Acute hypoxic respiratory failure likely due to Aspiration PNA in setting of  severe dysphagia in setting of Throat cancer s/p radiation  #Complicated UTI  #Hx- UTI, CVA, afib, CAD s/p CABGF, CHF s/p ICD, DM2, PVD, CKD stage 3  #Throat cancer s/p radiation with severe dysphagia  #Hx of Bladder Cancer s/p urostomy    I had a long conversation with Judy Hardwick regarding dispo planning. We then spoke again later in the afternoon regarding current goals of care. Their concern is they would like patient to pass while in the hospital but I ensured them that Hospice can continue same current management in a more appropriate setting especially if death is imminent. We will be stopping eliquis and metoprolol. We have agreed that POC glucose checking will not provide us any additional help and does not help with CMO protocol. Family is amenable to proceeding with [facility] for hospice care. Spoke with CM to relay message, CM will reach out to family to discuss further plans    Spent over 55 min reviewing chart and on coordinating patient care during interdisciplinary rounds     Dispo: pending placement

## 2023-02-24 NOTE — CHART NOTE - NSCHARTNOTEFT_GEN_A_CORE
PALLIATIVE MEDICINE INTERDISCIPLINARY TEAM NOTE    Provider:  [   ]Social Work   [   ]          [   ] Initial visit [   ] Follow up    Family or contact name / phone #   Met with: [   ] Patient  [   ] Family  [   ] Other:    Primary Language: [   ] English [   ] Other*:                      *Interpretation provided by:    SUPPORT DIAGNOSES            (Check all that apply)  [   ] Psychosocial spiritual assessment (PSSA)  [   ] EOL issues  [   ] Cultural / spiritual concerns  [   ] Pain / suffering  [   ] Dementia / AMS  [   ] Other:  [   ] AD issues  [   ] Grief / loss / sadness  [   ] Discharge issues  [   ] Distress / coping    PSYCHOSOCIAL ASSESSMENT OF PATIENT         (Check all that apply)  [   ] Initial Assessment            [   ] Reassessment          [   ] Not Applicable this visit    Pain/suffering acuity:  [   ] None to mild (0-3)           [   ] Moderate (4-6)        [   ] High (7-10)    Mental Status:  [   ] Alert/oriented (x3)          [   ] Confused/Altered(x2/x1)         [   ] Non-resp    Functional status:  [   ] Independent w ADLs      [   ] Needs Assistance             [   ] Bedbound/Full Care    Coping:  [   ] Coping well                     [   ] Coping w/difficulty            [   ] Poor coping    Support system:  [   ] Strong                              [   ] Adequate                        [   ] Inadequate    SPIRITUAL ASSESSMENT  Sikh/Spiritual practice: __Catholic_________________________    Role of organized Hinduism:  [   ] Important                     [ x ] Some (fam tradition, cultural)               [   ] None    Effects on medical care:  [   ] Yes, _____________________________________                         [ x  None    Cultural/Congregational need:  [   ] Yes, _____________________________________                         [ x  ] None    Refer to Pastoral Care:  [   ] Yes           [ x  ] No, not at this time    SERVICE PROVIDED  [  ]PSSA                                                                      [   ]Discharge support / facilitation  [  ]AD / goals of care counseling                                  [   ]EOL / death / bereavement counseling  [ x ]Counseling / support                                               [   ] Family meeting  [x]Prayer / sacrament / ritual                                        [   ] Referral   [  ]Other                                                                       NOTE and Plan of Care (PoC): Pt seemed comfortable. Family members are at bedside, Pt has community and they are finding ways to cope. I continue to be an empathetic, supportive pastoral presence for the family. I will follow up as needed.

## 2023-02-24 NOTE — PROGRESS NOTE ADULT - SUBJECTIVE AND OBJECTIVE BOX
Progress note    Patient seen and examined at bedside. He is responsive, on 2L oxygen and does not appear in any acute distress.    INTERVAL HPI/OVERNIGHT EVENTS:    REVIEW OF SYSTEMS:  All other 13 Review of systems were reviewed and are negative    FAMILY HISTORY:  Family history of early CAD      T(C): --  HR: --  BP: --  RR: --  SpO2: --  Wt(kg): --Vital Signs Last 24 Hrs  T(C): --  T(F): --  HR: --  BP: --  BP(mean): --  RR: --  SpO2: --      No Known Allergies      PHYSICAL EXAM:  GENERAL: NAD, well-groomed, well-developed  HEAD:  Atraumatic, Normocephalic  EYES: EOMI, PERRLA, conjunctiva and sclera clear  ENMT: No tonsillar erythema, exudates, or enlargement; Moist mucous membranes, Good dentition, No lesions  NECK: Supple, No JVD, Normal thyroid  NERVOUS SYSTEM:  Alert & Oriented X3, Good concentration; Motor Strength 5/5 B/L upper and lower extremities; DTRs 2+ intact and symmetric  CHEST/LUNG: Clear to percussion bilaterally; No rales, rhonchi, wheezing, or rubs  HEART: Regular rate and rhythm; No murmurs, rubs, or gallops  ABDOMEN: Soft, Nontender, Nondistended; Bowel sounds present  EXTREMITIES:  2+ Peripheral Pulses, No clubbing, cyanosis, or edema  LYMPH: No lymphadenopathy noted  SKIN: No rashes or lesions    Consultant(s) Notes Reviewed:  [x ] YES  [ ] NO  Care Discussed with Consultants/Other Providers [ x] YES  [ ] NO    LABS:      RADIOLOGY & ADDITIONAL TESTS:    Imaging Personally Reviewed:  [ ] YES  [ ] NO  acetaminophen     Tablet .. 325 milliGRAM(s) Oral every 4 hours PRN  haloperidol    Injectable 0.5 milliGRAM(s) IV Push every 6 hours PRN  haloperidol    Injectable 0.5 milliGRAM(s) IV Push every 12 hours  HYDROmorphone  Injectable 0.5 milliGRAM(s) IV Push every 2 hours PRN  influenza  Vaccine (HIGH DOSE) 0.7 milliLiter(s) IntraMuscular once  LORazepam   Injectable 0.5 milliGRAM(s) IV Push every 4 hours PRN  neomycin/bacitracin/polymyxin Topical Ointment 1 Application(s) Topical daily  pantoprazole    Tablet 40 milliGRAM(s) Oral before breakfast      HEALTH ISSUES - PROBLEM Dx:  Sepsis    Dysphagia    Palliative care by specialist    Agitation    Dyspnea    88-year-old male with past medical history of bladder cancer, throat cancer, CVA in 2015, CAD, CABG, a-fib on eliquis and with defibrillator presents with complaint of shortness of breath, found to have septic shock possibly in setting of severe dysphagia in setting of Throat cancer s/p radiation. Patient was directly admitted to MICU where he was on pressor support. Now, off pressor, patient is downgraded to medical floor.    #Sepsis (unclear etiology urine vs lungs)   #Acute hypoxic respiratory failure likely due to Aspiration PNA in setting of  severe dysphagia in setting of Throat cancer s/p radiation  #Complicated UTI  #Hx- UTI, CVA, afib, CAD s/p CABGF, CHF s/p ICD, DM2, PVD, CKD stage 3  #Throat cancer s/p radiation with severe dysphagia  #Hx of Bladder Cancer s/p urostomy    DC planning to outpatient hospice service. Family is looking for facility    DC planning ~25 minutes Progress note    Patient seen and examined at bedside. He is responsive, on 2L oxygen and does not appear in any acute distress.    INTERVAL HPI/OVERNIGHT EVENTS:    REVIEW OF SYSTEMS:  All other 13 Review of systems were reviewed and are negative    FAMILY HISTORY:  Family history of early CAD      T(C): --  HR: --  BP: --  RR: --  SpO2: --  Wt(kg): --Vital Signs Last 24 Hrs  T(C): --  T(F): --  HR: --  BP: --  BP(mean): --  RR: --  SpO2: --      No Known Allergies      PHYSICAL EXAM:  GENERAL: NAD, well-groomed, well-developed  HEAD:  Atraumatic, Normocephalic  EYES: EOMI, PERRLA, conjunctiva and sclera clear  ENMT: No tonsillar erythema, exudates, or enlargement; Moist mucous membranes, Good dentition, No lesions  NECK: Supple, No JVD, Normal thyroid  NERVOUS SYSTEM:  Alert & Oriented X3, Good concentration; Motor Strength 5/5 B/L upper and lower extremities; DTRs 2+ intact and symmetric  CHEST/LUNG: Clear to percussion bilaterally; No rales, rhonchi, wheezing, or rubs  HEART: Regular rate and rhythm; No murmurs, rubs, or gallops  ABDOMEN: Soft, Nontender, Nondistended; Bowel sounds present  EXTREMITIES:  2+ Peripheral Pulses, No clubbing, cyanosis, or edema  LYMPH: No lymphadenopathy noted  SKIN: No rashes or lesions    Consultant(s) Notes Reviewed:  [x ] YES  [ ] NO  Care Discussed with Consultants/Other Providers [ x] YES  [ ] NO    LABS:      RADIOLOGY & ADDITIONAL TESTS:    Imaging Personally Reviewed:  [ ] YES  [ ] NO  acetaminophen     Tablet .. 325 milliGRAM(s) Oral every 4 hours PRN  haloperidol    Injectable 0.5 milliGRAM(s) IV Push every 6 hours PRN  haloperidol    Injectable 0.5 milliGRAM(s) IV Push every 12 hours  HYDROmorphone  Injectable 0.5 milliGRAM(s) IV Push every 2 hours PRN  influenza  Vaccine (HIGH DOSE) 0.7 milliLiter(s) IntraMuscular once  LORazepam   Injectable 0.5 milliGRAM(s) IV Push every 4 hours PRN  neomycin/bacitracin/polymyxin Topical Ointment 1 Application(s) Topical daily  pantoprazole    Tablet 40 milliGRAM(s) Oral before breakfast      HEALTH ISSUES - PROBLEM Dx:  Sepsis    Dysphagia    Palliative care by specialist    Agitation    Dyspnea    88-year-old male with past medical history of bladder cancer, throat cancer, CVA in 2015, CAD, CABG, a-fib on eliquis and with defibrillator presents with complaint of shortness of breath, found to have septic shock possibly in setting of severe dysphagia in setting of Throat cancer s/p radiation. Patient was directly admitted to MICU where he was on pressor support. Now, off pressor, patient is downgraded to medical floor.    #Sepsis (unclear etiology urine vs lungs)   #Acute hypoxic respiratory failure likely due to Aspiration PNA in setting of  severe dysphagia in setting of Throat cancer s/p radiation  #Complicated UTI  #Hx- UTI, CVA, afib, CAD s/p CABGF, CHF s/p ICD, DM2, PVD, CKD stage 3  #Throat cancer s/p radiation with severe dysphagia  #Hx of Bladder Cancer s/p urostomy    DC planning to outpatient hospice service. [facility] is tentative choice. DC planning    DC planning ~25 minutes

## 2023-02-25 NOTE — PROGRESS NOTE ADULT - REASON FOR ADMISSION
sepsis

## 2023-02-25 NOTE — PROGRESS NOTE ADULT - SUBJECTIVE AND OBJECTIVE BOX
Progress note    Patient seen and examined at bedside. He is on 2L NC. He is responsive though lethargic.    INTERVAL HPI/OVERNIGHT EVENTS:    REVIEW OF SYSTEMS:  All other 13 Review of systems were reviewed and are negative    FAMILY HISTORY:  Family history of early CAD      T(C): --  HR: 64 (02-24-23 @ 20:38) (64 - 64)  BP: 158/70 (02-24-23 @ 20:38) (158/70 - 158/70)  RR: --  SpO2: 97% (02-24-23 @ 20:38) (97% - 97%)  Wt(kg): --Vital Signs Last 24 Hrs  T(C): --  T(F): --  HR: 64 (24 Feb 2023 20:38) (64 - 64)  BP: 158/70 (24 Feb 2023 20:38) (158/70 - 158/70)  BP(mean): --  RR: --  SpO2: 97% (24 Feb 2023 20:38) (97% - 97%)      No Known Allergies      PHYSICAL EXAM:  GENERAL: NAD, well-groomed, well-developed  HEAD:  Atraumatic, Normocephalic  EYES: EOMI, PERRLA, conjunctiva and sclera clear  ENMT: No tonsillar erythema, exudates, or enlargement; Moist mucous membranes, Good dentition, No lesions  NECK: Supple, No JVD, Normal thyroid  NERVOUS SYSTEM:  Alert & Oriented X3, Good concentration; Motor Strength 5/5 B/L upper and lower extremities; DTRs 2+ intact and symmetric  CHEST/LUNG: Clear to percussion bilaterally; No rales, rhonchi, wheezing, or rubs  HEART: Regular rate and rhythm; No murmurs, rubs, or gallops  ABDOMEN: Soft, Nontender, Nondistended; Bowel sounds present  EXTREMITIES:  2+ Peripheral Pulses, No clubbing, cyanosis, or edema  LYMPH: No lymphadenopathy noted  SKIN: No rashes or lesions    Consultant(s) Notes Reviewed:  [x ] YES  [ ] NO  Care Discussed with Consultants/Other Providers [ x] YES  [ ] NO    LABS:      RADIOLOGY & ADDITIONAL TESTS:    Imaging Personally Reviewed:  [ ] YES  [ ] NO  acetaminophen     Tablet .. 325 milliGRAM(s) Oral every 4 hours PRN  haloperidol    Injectable 0.5 milliGRAM(s) IV Push every 6 hours PRN  haloperidol    Injectable 0.5 milliGRAM(s) IV Push every 12 hours  influenza  Vaccine (HIGH DOSE) 0.7 milliLiter(s) IntraMuscular once  neomycin/bacitracin/polymyxin Topical Ointment 1 Application(s) Topical daily  pantoprazole    Tablet 40 milliGRAM(s) Oral before breakfast      HEALTH ISSUES - PROBLEM Dx:  Sepsis    Dysphagia    Palliative care by specialist    Agitation    Dyspnea    88-year-old male with past medical history of bladder cancer, throat cancer, CVA in 2015, CAD, CABG, a-fib on eliquis and with defibrillator presents with complaint of shortness of breath, found to have septic shock possibly in setting of severe dysphagia in setting of Throat cancer s/p radiation. Patient was directly admitted to MICU where he was on pressor support. Now, off pressor, patient is downgraded to medical floor.    #Sepsis (unclear etiology urine vs lungs)   #Acute hypoxic respiratory failure likely due to Aspiration PNA in setting of  severe dysphagia in setting of Throat cancer s/p radiation  #Complicated UTI  #Hx- UTI, CVA, afib, CAD s/p CABGF, CHF s/p ICD, DM2, PVD, CKD stage 3  #Throat cancer s/p radiation with severe dysphagia  #Hx of Bladder Cancer s/p urostomy    DC planning to outpatient hospice service. [facility] is tentative choice, Plan for dc on 2/28    Total time spent to complete patient's bedside assessment, review medical chart, discuss medical plan of care with covering medical team was ___25_____ with > 50% of time spent face to face w/ patient, discussion with patient/family and/or coordination of care

## 2023-02-25 NOTE — PROGRESS NOTE ADULT - PROVIDER SPECIALTY LIST ADULT
Hospitalist
Hospitalist
Infectious Disease
Nephrology
Hospitalist
Hospitalist
Nephrology
Cardiology
Critical Care
Hospitalist
Infectious Disease
Nephrology
Hospitalist
Nephrology
Pulmonology
Palliative Care

## 2023-02-25 NOTE — DIETITIAN INITIAL EVALUATION ADULT - OTHER INFO
pt is 88 year old male with hx of bladder CA, throat CA s/p RT 5 years ago, chronic UTI's, CVA, CAD, CABG, afib, defibrillator presents with SOB admitted with sepsis, aspiration PNA with hypotension requiring pressors, gram negative rods in urine. 2/15 s/p MBS--> consider po comfort feeding. 2/16 GOC meeting--> comfort fees with knowledge of risks, no EN. 2/17 full comfort measures only. pt being followed by palliative team, hospice placement pending

## 2023-02-25 NOTE — DIETITIAN INITIAL EVALUATION ADULT - ORAL INTAKE PTA/DIET HISTORY
as per family at bedside pt with decline in po intake for past few months PTA, unsure of onset of weight loss however began ~ 5 years ago after RT for throat CA. Only weight hx in EMR is 95.2 kgs from ICD placement visit in 2020 compared to 63.7 kg this visit    presently on comfort feeds of minced and moist with thins 1;1 feed

## 2023-02-26 NOTE — DISCHARGE NOTE FOR THE EXPIRED PATIENT - HOSPITAL COURSE
Pt is a 88-year-old male with past medical history of bladder cancer, throat cancer, CVA in 2015, CAD, CABG, a-fib on eliquis and with defibrillator presents with complaint of shortness of breath, found to have septic shock secondary to aspiration PNA possibly in setting of severe dysphagia in setting of Throat cancer s/p radiation. Patient was directly admitted to MICU where he was on pressor support, started on abx, pt was weaned off pressor, patient is downgraded to medical floor. Prognosis had remained poor and family decided on hospice and pt was pending dc to hospice. Pt is DNR/DNI. On 23 the nurse noted that pt was unresponsive and pulseless. Pt was seen and examined at bedside, apneic, asystolic, with bilateral pupils dilated and fixed. Pt was pronounced  at 3:39AM, Family contacted and condolences were extended, they will not pursue autopsy at this time. Organ donation contacted, pt is not a candidate for organ donation however may be a candidate for the brain bank, confirmation #[id number], spoke to representative: Rachid Love. Cause of death: throat cancer

## 2023-03-06 DIAGNOSIS — Z95.1 PRESENCE OF AORTOCORONARY BYPASS GRAFT: ICD-10-CM

## 2023-03-06 DIAGNOSIS — Z86.73 PERSONAL HISTORY OF TRANSIENT ISCHEMIC ATTACK (TIA), AND CEREBRAL INFARCTION WITHOUT RESIDUAL DEFICITS: ICD-10-CM

## 2023-03-06 DIAGNOSIS — N39.0 URINARY TRACT INFECTION, SITE NOT SPECIFIED: ICD-10-CM

## 2023-03-06 DIAGNOSIS — I50.9 HEART FAILURE, UNSPECIFIED: ICD-10-CM

## 2023-03-06 DIAGNOSIS — J18.9 PNEUMONIA, UNSPECIFIED ORGANISM: ICD-10-CM

## 2023-03-06 DIAGNOSIS — Z16.12 EXTENDED SPECTRUM BETA LACTAMASE (ESBL) RESISTANCE: ICD-10-CM

## 2023-03-06 DIAGNOSIS — E11.51 TYPE 2 DIABETES MELLITUS WITH DIABETIC PERIPHERAL ANGIOPATHY WITHOUT GANGRENE: ICD-10-CM

## 2023-03-06 DIAGNOSIS — R13.10 DYSPHAGIA, UNSPECIFIED: ICD-10-CM

## 2023-03-06 DIAGNOSIS — J69.0 PNEUMONITIS DUE TO INHALATION OF FOOD AND VOMIT: ICD-10-CM

## 2023-03-06 DIAGNOSIS — B96.1 KLEBSIELLA PNEUMONIAE [K. PNEUMONIAE] AS THE CAUSE OF DISEASES CLASSIFIED ELSEWHERE: ICD-10-CM

## 2023-03-06 DIAGNOSIS — R45.1 RESTLESSNESS AND AGITATION: ICD-10-CM

## 2023-03-06 DIAGNOSIS — N18.30 CHRONIC KIDNEY DISEASE, STAGE 3 UNSPECIFIED: ICD-10-CM

## 2023-03-06 DIAGNOSIS — Z92.3 PERSONAL HISTORY OF IRRADIATION: ICD-10-CM

## 2023-03-06 DIAGNOSIS — Z51.5 ENCOUNTER FOR PALLIATIVE CARE: ICD-10-CM

## 2023-03-06 DIAGNOSIS — Z79.84 LONG TERM (CURRENT) USE OF ORAL HYPOGLYCEMIC DRUGS: ICD-10-CM

## 2023-03-06 DIAGNOSIS — Z20.822 CONTACT WITH AND (SUSPECTED) EXPOSURE TO COVID-19: ICD-10-CM

## 2023-03-06 DIAGNOSIS — Z93.6 OTHER ARTIFICIAL OPENINGS OF URINARY TRACT STATUS: ICD-10-CM

## 2023-03-06 DIAGNOSIS — Z85.819 PERSONAL HISTORY OF MALIGNANT NEOPLASM OF UNSPECIFIED SITE OF LIP, ORAL CAVITY, AND PHARYNX: ICD-10-CM

## 2023-03-06 DIAGNOSIS — I25.10 ATHEROSCLEROTIC HEART DISEASE OF NATIVE CORONARY ARTERY WITHOUT ANGINA PECTORIS: ICD-10-CM

## 2023-03-06 DIAGNOSIS — R44.3 HALLUCINATIONS, UNSPECIFIED: ICD-10-CM

## 2023-03-06 DIAGNOSIS — Z79.01 LONG TERM (CURRENT) USE OF ANTICOAGULANTS: ICD-10-CM

## 2023-03-06 DIAGNOSIS — Z95.810 PRESENCE OF AUTOMATIC (IMPLANTABLE) CARDIAC DEFIBRILLATOR: ICD-10-CM

## 2023-03-06 DIAGNOSIS — N17.9 ACUTE KIDNEY FAILURE, UNSPECIFIED: ICD-10-CM

## 2023-03-06 DIAGNOSIS — Z66 DO NOT RESUSCITATE: ICD-10-CM

## 2023-03-06 DIAGNOSIS — R65.21 SEVERE SEPSIS WITH SEPTIC SHOCK: ICD-10-CM

## 2023-03-06 DIAGNOSIS — A41.9 SEPSIS, UNSPECIFIED ORGANISM: ICD-10-CM

## 2023-03-06 DIAGNOSIS — Z87.440 PERSONAL HISTORY OF URINARY (TRACT) INFECTIONS: ICD-10-CM

## 2023-03-06 DIAGNOSIS — J96.01 ACUTE RESPIRATORY FAILURE WITH HYPOXIA: ICD-10-CM

## 2023-03-06 DIAGNOSIS — R63.4 ABNORMAL WEIGHT LOSS: ICD-10-CM

## 2023-03-06 DIAGNOSIS — Z85.51 PERSONAL HISTORY OF MALIGNANT NEOPLASM OF BLADDER: ICD-10-CM

## 2023-03-06 DIAGNOSIS — E11.22 TYPE 2 DIABETES MELLITUS WITH DIABETIC CHRONIC KIDNEY DISEASE: ICD-10-CM

## 2023-04-21 ENCOUNTER — APPOINTMENT (OUTPATIENT)
Dept: CARDIOLOGY | Facility: CLINIC | Age: 88
End: 2023-04-21

## 2024-02-07 NOTE — DIETITIAN INITIAL EVALUATION ADULT - PERTINENT MEDS FT
MEDICATIONS  (STANDING):  haloperidol    Injectable 0.5 milliGRAM(s) IV Push every 12 hours  influenza  Vaccine (HIGH DOSE) 0.7 milliLiter(s) IntraMuscular once  neomycin/bacitracin/polymyxin Topical Ointment 1 Application(s) Topical daily  pantoprazole    Tablet 40 milliGRAM(s) Oral before breakfast    MEDICATIONS  (PRN):  acetaminophen     Tablet .. 325 milliGRAM(s) Oral every 4 hours PRN Temp greater or equal to 38C (100.4F)  haloperidol    Injectable 0.5 milliGRAM(s) IV Push every 6 hours PRN Agitation  
Patient requests all Lab, Cardiology, and Radiology Results on their Discharge Instructions
